# Patient Record
Sex: FEMALE | Race: WHITE | Employment: OTHER | URBAN - METROPOLITAN AREA
[De-identification: names, ages, dates, MRNs, and addresses within clinical notes are randomized per-mention and may not be internally consistent; named-entity substitution may affect disease eponyms.]

---

## 2017-01-12 ENCOUNTER — PATIENT OUTREACH (OUTPATIENT)
Dept: FAMILY MEDICINE CLINIC | Facility: CLINIC | Age: 62
End: 2017-01-12

## 2017-01-12 DIAGNOSIS — E11.9 CONTROLLED TYPE 2 DIABETES MELLITUS WITHOUT COMPLICATION, WITHOUT LONG-TERM CURRENT USE OF INSULIN (HCC): Primary | ICD-10-CM

## 2017-01-12 NOTE — PROGRESS NOTES
Called to pt's cell and informed her that I put the lab work in for her to be completed at her earliest convenience for her DM status. I informed her that she needs to be fasting for the 10 hours with only water prior to whenever she is able to go.  Left pt

## 2017-01-12 NOTE — PROGRESS NOTES
Pt. Returned the call and said she is currently in [place] until the beginning of March and will get her labs done upon return.   She also stated that she may need a refill 2 times ( as they are on a month to month refill issue) while she is down in [place]

## 2017-01-17 ENCOUNTER — TELEPHONE (OUTPATIENT)
Dept: FAMILY MEDICINE CLINIC | Facility: CLINIC | Age: 62
End: 2017-01-17

## 2017-01-17 RX ORDER — CELECOXIB 200 MG/1
CAPSULE ORAL
Qty: 30 CAPSULE | Refills: 1 | Status: SHIPPED | OUTPATIENT
Start: 2017-01-17 | End: 2017-03-19

## 2017-01-20 RX ORDER — POTASSIUM CHLORIDE 750 MG/1
10 TABLET, EXTENDED RELEASE ORAL
Qty: 90 TABLET | Refills: 0 | Status: SHIPPED | OUTPATIENT
Start: 2017-01-20 | End: 2017-05-15

## 2017-01-20 RX ORDER — FUROSEMIDE 20 MG/1
20 TABLET ORAL
Qty: 90 TABLET | Refills: 0 | Status: SHIPPED | OUTPATIENT
Start: 2017-01-20 | End: 2017-05-15

## 2017-01-20 RX ORDER — ATORVASTATIN CALCIUM 20 MG/1
TABLET, FILM COATED ORAL
Qty: 90 TABLET | Refills: 0 | Status: SHIPPED | OUTPATIENT
Start: 2017-01-20 | End: 2017-05-15

## 2017-01-20 RX ORDER — METFORMIN HYDROCHLORIDE 500 MG/1
TABLET, EXTENDED RELEASE ORAL
Qty: 90 TABLET | Refills: 0 | Status: SHIPPED | OUTPATIENT
Start: 2017-01-20 | End: 2017-04-04

## 2017-01-20 NOTE — TELEPHONE ENCOUNTER
Pt returning phone call from the nurse. Pt state that she is in [place] and does not returning until the middle of march. Pt will scheduled appt for after that time but until then she will need prescription to cover her until then.   Pt has scheduled appt

## 2017-03-14 ENCOUNTER — PATIENT OUTREACH (OUTPATIENT)
Dept: FAMILY MEDICINE CLINIC | Facility: CLINIC | Age: 62
End: 2017-03-14

## 2017-03-14 NOTE — PROGRESS NOTES
Called to pt. She had to cancel her appt for tomorrow as she has car issues, but she rescheduled for 4/4/17; and she is aware to try to obtain the labs before she goes to the appt so that she might have a more meaningful visit.  She is aware to be fasting w

## 2017-03-19 RX ORDER — CELECOXIB 200 MG/1
CAPSULE ORAL
Qty: 30 CAPSULE | Refills: 1 | Status: SHIPPED | OUTPATIENT
Start: 2017-03-19 | End: 2017-05-27

## 2017-04-01 ENCOUNTER — APPOINTMENT (OUTPATIENT)
Dept: LAB | Age: 62
End: 2017-04-01
Attending: FAMILY MEDICINE
Payer: COMMERCIAL

## 2017-04-01 DIAGNOSIS — E11.9 CONTROLLED TYPE 2 DIABETES MELLITUS WITHOUT COMPLICATION, WITHOUT LONG-TERM CURRENT USE OF INSULIN (HCC): ICD-10-CM

## 2017-04-01 PROCEDURE — 82570 ASSAY OF URINE CREATININE: CPT

## 2017-04-01 PROCEDURE — 80053 COMPREHEN METABOLIC PANEL: CPT

## 2017-04-01 PROCEDURE — 80061 LIPID PANEL: CPT

## 2017-04-01 PROCEDURE — 36415 COLL VENOUS BLD VENIPUNCTURE: CPT

## 2017-04-01 PROCEDURE — 82043 UR ALBUMIN QUANTITATIVE: CPT

## 2017-04-01 PROCEDURE — 83036 HEMOGLOBIN GLYCOSYLATED A1C: CPT

## 2017-04-01 PROCEDURE — 84443 ASSAY THYROID STIM HORMONE: CPT

## 2017-04-04 ENCOUNTER — OFFICE VISIT (OUTPATIENT)
Dept: FAMILY MEDICINE CLINIC | Facility: CLINIC | Age: 62
End: 2017-04-04

## 2017-04-04 ENCOUNTER — TELEPHONE (OUTPATIENT)
Dept: FAMILY MEDICINE CLINIC | Facility: CLINIC | Age: 62
End: 2017-04-04

## 2017-04-04 VITALS
TEMPERATURE: 98 F | SYSTOLIC BLOOD PRESSURE: 128 MMHG | WEIGHT: 293 LBS | HEART RATE: 83 BPM | BODY MASS INDEX: 70 KG/M2 | DIASTOLIC BLOOD PRESSURE: 91 MMHG

## 2017-04-04 DIAGNOSIS — E66.9 DIABETES MELLITUS TYPE 2 IN OBESE (HCC): Primary | ICD-10-CM

## 2017-04-04 DIAGNOSIS — E11.69 DIABETES MELLITUS TYPE 2 IN OBESE (HCC): Primary | ICD-10-CM

## 2017-04-04 DIAGNOSIS — I10 ESSENTIAL HYPERTENSION: ICD-10-CM

## 2017-04-04 PROCEDURE — 99214 OFFICE O/P EST MOD 30 MIN: CPT | Performed by: FAMILY MEDICINE

## 2017-04-04 PROCEDURE — 99212 OFFICE O/P EST SF 10 MIN: CPT | Performed by: FAMILY MEDICINE

## 2017-04-04 RX ORDER — METFORMIN HYDROCHLORIDE 500 MG/1
500 TABLET, EXTENDED RELEASE ORAL 2 TIMES DAILY WITH MEALS
Qty: 180 TABLET | Refills: 1 | Status: SHIPPED | OUTPATIENT
Start: 2017-04-04 | End: 2017-09-27

## 2017-04-04 RX ORDER — LISINOPRIL 10 MG/1
10 TABLET ORAL DAILY
Qty: 90 TABLET | Refills: 1 | Status: SHIPPED | OUTPATIENT
Start: 2017-04-04 | End: 2017-09-27

## 2017-04-04 RX ORDER — PROPRANOLOL/HYDROCHLOROTHIAZID 40 MG-25MG
TABLET ORAL
Qty: 180 CAPSULE | Refills: 1 | Status: ON HOLD | OUTPATIENT
Start: 2017-04-04 | End: 2018-07-20

## 2017-04-04 RX ORDER — ASPIRIN 81 MG/1
81 TABLET ORAL DAILY
Qty: 90 TABLET | Refills: 1 | Status: SHIPPED | OUTPATIENT
Start: 2017-04-04 | End: 2017-09-27

## 2017-04-04 NOTE — TELEPHONE ENCOUNTER
Pharmacy needs directions clarified for metformin hcl 500mg      Current Outpatient Prescriptions:  MetFORMIN HCl  MG Oral Tablet 24 Hr Take 1 tablet (500 mg total) by mouth 2 (two) times daily with meals.  TAKE 1 TABLET (500 MG TOTAL) BY MOUTH ONCE D

## 2017-04-11 NOTE — PROGRESS NOTES
HPI:    Patient ID: Jacy Ontiveros is a 60year old female. Diabetes  She presents for her follow-up diabetic visit. She has type 2 diabetes mellitus. Her disease course has been improving. There are no hypoglycemic associated symptoms.  Pertinent n CELECOXIB 200 MG Oral Cap TAKE ONE CAPSULE BY MOUTH EVERY DAY Disp: 30 capsule Rfl: 1   Potassium Chloride ER (KLOR-CON M10) 10 MEQ Oral Tab CR Take 1 tablet (10 mEq total) by mouth once daily.  Disp: 90 tablet Rfl: 0   furosemide 20 MG Oral Tab Take 1 ta

## 2017-04-20 ENCOUNTER — TELEPHONE (OUTPATIENT)
Dept: FAMILY MEDICINE CLINIC | Facility: CLINIC | Age: 62
End: 2017-04-20

## 2017-04-20 NOTE — TELEPHONE ENCOUNTER
Called to pt and she was made aware of Dr. Aroldo Gregorio answer to her question. She will get her lab done in 3 months and she knows the lab was already entered for her by Dr. Dinh Medellin.

## 2017-04-20 NOTE — TELEPHONE ENCOUNTER
Pt. Called to my  and said she had her labs and visit with you Dr. Raquel Elias, but a friend of hers also on Metformin asked her if she checks her blood sugars. She said she was never told to do and of course doesn't have any of the equipment.  What are your r

## 2017-05-09 ENCOUNTER — TELEPHONE (OUTPATIENT)
Dept: FAMILY MEDICINE CLINIC | Facility: CLINIC | Age: 62
End: 2017-05-09

## 2017-05-09 DIAGNOSIS — G47.30 SLEEP APNEA, UNSPECIFIED TYPE: Primary | ICD-10-CM

## 2017-05-16 RX ORDER — FUROSEMIDE 20 MG/1
TABLET ORAL
Qty: 90 TABLET | Refills: 0 | Status: SHIPPED | OUTPATIENT
Start: 2017-05-16 | End: 2017-08-11

## 2017-05-16 RX ORDER — ATORVASTATIN CALCIUM 20 MG/1
TABLET, FILM COATED ORAL
Qty: 90 TABLET | Refills: 0 | Status: SHIPPED | OUTPATIENT
Start: 2017-05-16 | End: 2017-08-11

## 2017-05-16 NOTE — TELEPHONE ENCOUNTER
PLEASE ADVISE ON REFILL. THANKS.   Recent Visits       Provider Department Primary Dx    1 month ago Crystal Parnell MD [facility], [facility], [address] Diabetes mellitus type 2 in obese [facility])    11 months ago Crystal Parnell MD [address] [facility] 04/01/2017   GLOBULIN 3.6 04/01/2017   AGRATIO 1.1 02/23/2016   ANIONGAP 7 04/01/2017   OSMOCALC 295 04/01/2017

## 2017-05-18 RX ORDER — POTASSIUM CHLORIDE 10 MEQ/1
TABLET, EXTENDED RELEASE ORAL
Qty: 90 TABLET | Refills: 0 | Status: SHIPPED | OUTPATIENT
Start: 2017-05-18 | End: 2017-08-15

## 2017-05-20 RX ORDER — POTASSIUM CHLORIDE 750 MG/1
10 TABLET, EXTENDED RELEASE ORAL
Qty: 90 TABLET | Refills: 0 | Status: CANCELLED | OUTPATIENT
Start: 2017-05-20

## 2017-05-20 NOTE — TELEPHONE ENCOUNTER
From: Amrita Cheng  To:  Reynaldo Deras MD  Sent: 5/17/2017 5:24 PM CDT  Subject: Medication Renewal Request    Original authorizing provider: MD Amrita Beaver would like a refill of the following medications:  Potassium Chlori

## 2017-05-30 RX ORDER — CELECOXIB 200 MG/1
CAPSULE ORAL
Qty: 30 CAPSULE | Refills: 1 | Status: SHIPPED | OUTPATIENT
Start: 2017-05-30 | End: 2017-07-29

## 2017-05-30 NOTE — TELEPHONE ENCOUNTER
Shaw Ayala from [facility] calling to inquire on status. States that they need referral for patient's HMO insurance. Requesting CPAP supplies - NOT machine. Please fax referral  once approved to [phone].  States that they have not received anyth

## 2017-06-02 NOTE — TELEPHONE ENCOUNTER
Per Roma Deluna they received an Order BUT need referral due to pt has an HMO pls fax the referral for Cpap supplies of pt to [phone] Attn: Roma Deluna.

## 2017-07-31 ENCOUNTER — TELEPHONE (OUTPATIENT)
Dept: INTERNAL MEDICINE CLINIC | Facility: CLINIC | Age: 62
End: 2017-07-31

## 2017-07-31 RX ORDER — CELECOXIB 200 MG/1
CAPSULE ORAL
Qty: 30 CAPSULE | Refills: 1 | Status: SHIPPED | OUTPATIENT
Start: 2017-07-31 | End: 2017-10-03

## 2017-08-14 ENCOUNTER — TELEPHONE (OUTPATIENT)
Dept: INTERNAL MEDICINE CLINIC | Facility: CLINIC | Age: 62
End: 2017-08-14

## 2017-08-14 DIAGNOSIS — Z12.11 ENCOUNTER FOR COLORECTAL CANCER SCREENING: Primary | ICD-10-CM

## 2017-08-14 DIAGNOSIS — Z12.12 ENCOUNTER FOR COLORECTAL CANCER SCREENING: Primary | ICD-10-CM

## 2017-08-14 RX ORDER — FUROSEMIDE 20 MG/1
TABLET ORAL
Qty: 90 TABLET | Refills: 0 | Status: SHIPPED | OUTPATIENT
Start: 2017-08-14 | End: 2017-12-20

## 2017-08-14 RX ORDER — ATORVASTATIN CALCIUM 20 MG/1
TABLET, FILM COATED ORAL
Qty: 90 TABLET | Refills: 0 | Status: SHIPPED | OUTPATIENT
Start: 2017-08-14 | End: 2017-11-23

## 2017-08-14 NOTE — TELEPHONE ENCOUNTER
After a thorough review of the medical record and after contacting the patient, the patient has agreed to a FIT. I have pended the order. A-T Advancement Flap Text: The defect edges were debeveled with a #15 scalpel blade.  Given the location of the defect, shape of the defect and the proximity to free margins an A-T advancement flap was deemed most appropriate.  Using a sterile surgical marker, an appropriate advancement flap was drawn incorporating the defect and placing the expected incisions within the relaxed skin tension lines where possible.    The area thus outlined was incised deep to adipose tissue with a #15 scalpel blade.  The skin margins were undermined to an appropriate distance in all directions utilizing iris scissors.

## 2017-08-15 RX ORDER — POTASSIUM CHLORIDE 10 MEQ/1
TABLET, EXTENDED RELEASE ORAL
Qty: 90 TABLET | Refills: 0 | Status: SHIPPED | OUTPATIENT
Start: 2017-08-15 | End: 2017-11-17

## 2017-09-08 ENCOUNTER — TELEPHONE (OUTPATIENT)
Dept: OPHTHALMOLOGY | Facility: CLINIC | Age: 62
End: 2017-09-08

## 2017-09-08 DIAGNOSIS — E66.9 DIABETES MELLITUS TYPE 2 IN OBESE (HCC): Primary | ICD-10-CM

## 2017-09-08 DIAGNOSIS — E11.69 DIABETES MELLITUS TYPE 2 IN OBESE (HCC): Primary | ICD-10-CM

## 2017-09-08 NOTE — TELEPHONE ENCOUNTER
Patient has an appointment scheduled with Dr. Sean Lee on 9/14/17 and they need a referral for the appointment. Please do not hesitate to call me if you have any questions about this. Thank you, Sandra Morales at Dr. Paige Devine office [phone].    Diagnosis for a sushma

## 2017-09-14 ENCOUNTER — NURSE ONLY (OUTPATIENT)
Dept: OPHTHALMOLOGY | Facility: CLINIC | Age: 62
End: 2017-09-14

## 2017-09-14 ENCOUNTER — OFFICE VISIT (OUTPATIENT)
Dept: OPHTHALMOLOGY | Facility: CLINIC | Age: 62
End: 2017-09-14

## 2017-09-14 DIAGNOSIS — E11.9 DIABETES MELLITUS TYPE 2 WITHOUT RETINOPATHY (HCC): Primary | ICD-10-CM

## 2017-09-14 DIAGNOSIS — E11.69 DIABETES MELLITUS TYPE 2 IN OBESE (HCC): Primary | ICD-10-CM

## 2017-09-14 DIAGNOSIS — H43.393 VITREOUS FLOATERS OF BOTH EYES: ICD-10-CM

## 2017-09-14 DIAGNOSIS — H35.371 MACULAR PUCKER, RIGHT: ICD-10-CM

## 2017-09-14 DIAGNOSIS — H25.13 AGE-RELATED NUCLEAR CATARACT OF BOTH EYES: ICD-10-CM

## 2017-09-14 DIAGNOSIS — E66.9 DIABETES MELLITUS TYPE 2 IN OBESE (HCC): Primary | ICD-10-CM

## 2017-09-14 PROCEDURE — 99243 OFF/OP CNSLTJ NEW/EST LOW 30: CPT | Performed by: OPHTHALMOLOGY

## 2017-09-14 PROCEDURE — 99212 OFFICE O/P EST SF 10 MIN: CPT | Performed by: OPHTHALMOLOGY

## 2017-09-14 NOTE — PROGRESS NOTES
Joleen Austin is a 60year old female.     HPI:     HPI     Diabetic Eye Exam    Additional comments: Pt has been a diabetic for 3 years  3 years on pills/  0 years on Insulin   Pt does not check her BS at home   Last HA1C was  6.1 on 4/1/17 capsule Rfl: 1   MetFORMIN HCl  MG Oral Tablet 24 Hr Take 1 tablet (500 mg total) by mouth 2 (two) times daily with meals. TAKE 1 TABLET (500 MG TOTAL) BY MOUTH ONCE DAILY.  Disp: 180 tablet Rfl: 1   lisinopril 10 MG Oral Tab Take 1 tablet (10 mg tota Vessels Normal Normal    Periphery Normal Normal            Refraction     Wearing Rx       Sphere Cylinder Axis Add    Right -2.50 +1.00 010 +2.50    Left -3.00 +0.50 180 +2.50    Type:  Progressive bifocal          Manifest Refraction     Not tested.   Pa

## 2017-09-14 NOTE — PROGRESS NOTES
Diabetic Annual Assessment Clinical Note    There were no vitals taken for this visit. Foot Assessment    Visual Inspection of the feet completed at appointment.     · Skin Normal  · Callous Abnormal, some callouses noted bilateral heels  · Redness Norm

## 2017-09-14 NOTE — PROGRESS NOTES
Maximino Blevins is a 60year old female.     HPI:       Patient History:  Past Medical History:   Diagnosis Date   • Diabetes ([facility] [address]    • Diabetes mellitus ([facility] [address]    • HTN (hypertension), benign 1/20/2016   • Morbid obesity with BMI of 60.0-69.9, adult (H capsule Rfl: 1       Allergies:    Penicillins                 Comment:Other reaction(s): Rash    ROS:         PHYSICAL EXAM:      Not recorded           ASSESSMENT/PLAN:     Diagnoses and Plan:     No problem-specific Assessment & Plan notes found for [facility]

## 2017-09-14 NOTE — PATIENT INSTRUCTIONS
Diabetes mellitus type 2 without retinopathy ([facility] [address]  Diabetes type II: no background of retinopathy, no signs of neovascularization noted. Discussed ocular and systemic benefits of blood sugar control.   Diagnosis and treatment discussed in detail with allyssa

## 2017-09-27 RX ORDER — ASPIRIN 81 MG/1
81 TABLET ORAL DAILY
Qty: 90 TABLET | Refills: 1 | Status: SHIPPED | OUTPATIENT
Start: 2017-09-27 | End: 2018-06-15

## 2017-09-27 RX ORDER — LISINOPRIL 10 MG/1
10 TABLET ORAL DAILY
Qty: 90 TABLET | Refills: 1 | Status: SHIPPED | OUTPATIENT
Start: 2017-09-27 | End: 2018-03-19

## 2017-09-27 RX ORDER — METFORMIN HYDROCHLORIDE 500 MG/1
TABLET, EXTENDED RELEASE ORAL
Qty: 180 TABLET | Refills: 1 | Status: SHIPPED | OUTPATIENT
Start: 2017-09-27 | End: 2018-03-19

## 2017-10-03 RX ORDER — CELECOXIB 200 MG/1
CAPSULE ORAL
Qty: 30 CAPSULE | Refills: 1 | Status: SHIPPED | OUTPATIENT
Start: 2017-10-03 | End: 2017-12-21

## 2017-10-04 ENCOUNTER — TELEPHONE (OUTPATIENT)
Dept: INTERNAL MEDICINE CLINIC | Facility: CLINIC | Age: 62
End: 2017-10-04

## 2017-10-24 ENCOUNTER — OFFICE VISIT (OUTPATIENT)
Dept: FAMILY MEDICINE CLINIC | Facility: CLINIC | Age: 62
End: 2017-10-24

## 2017-10-24 VITALS — HEART RATE: 87 BPM | DIASTOLIC BLOOD PRESSURE: 80 MMHG | SYSTOLIC BLOOD PRESSURE: 120 MMHG | TEMPERATURE: 98 F

## 2017-10-24 DIAGNOSIS — F51.02 ADJUSTMENT INSOMNIA: ICD-10-CM

## 2017-10-24 DIAGNOSIS — R42 VERTIGO: Primary | ICD-10-CM

## 2017-10-24 PROCEDURE — 99214 OFFICE O/P EST MOD 30 MIN: CPT | Performed by: FAMILY MEDICINE

## 2017-10-24 PROCEDURE — 99212 OFFICE O/P EST SF 10 MIN: CPT | Performed by: FAMILY MEDICINE

## 2017-10-24 RX ORDER — MECLIZINE HCL 12.5 MG/1
12.5 TABLET ORAL 3 TIMES DAILY PRN
Qty: 40 TABLET | Refills: 0 | Status: SHIPPED | OUTPATIENT
Start: 2017-10-24 | End: 2019-08-16

## 2017-10-24 RX ORDER — ZOLPIDEM TARTRATE 10 MG/1
10 TABLET ORAL NIGHTLY PRN
Qty: 30 TABLET | Refills: 0 | Status: SHIPPED | OUTPATIENT
Start: 2017-10-24 | End: 2017-12-16

## 2017-10-25 NOTE — PROGRESS NOTES
HPI:    Patient ID: Jareth Angulo is a 60year old female. Had vertigo in the past so this is not new. FRiday night vertigo was bad lasted couple of hours. Now its better, it was bad when she would put head on the right side.    Had nausea but no Cardiovascular: Normal rate, regular rhythm, normal heart sounds and intact distal pulses. Pulmonary/Chest: Effort normal and breath sounds normal.   Neurological: She is alert and oriented to person, place, and time. She has normal reflexes.  She disp

## 2017-11-17 ENCOUNTER — TELEPHONE (OUTPATIENT)
Dept: FAMILY MEDICINE CLINIC | Facility: CLINIC | Age: 62
End: 2017-11-17

## 2017-11-17 NOTE — TELEPHONE ENCOUNTER
Pt requesting RN/Jorge appt for flu vaccine  Available any day except 11/20  Available morning or afternoon    Is flu vaccine still available?

## 2017-11-21 RX ORDER — POTASSIUM CHLORIDE 10 MEQ/1
TABLET, EXTENDED RELEASE ORAL
Qty: 90 TABLET | Refills: 0 | Status: SHIPPED | OUTPATIENT
Start: 2017-11-21 | End: 2018-02-12

## 2017-11-23 RX ORDER — ATORVASTATIN CALCIUM 20 MG/1
TABLET, FILM COATED ORAL
Qty: 90 TABLET | Refills: 0 | Status: SHIPPED | OUTPATIENT
Start: 2017-11-23 | End: 2018-02-12

## 2017-12-01 ENCOUNTER — IMMUNIZATION (OUTPATIENT)
Dept: FAMILY MEDICINE CLINIC | Facility: CLINIC | Age: 62
End: 2017-12-01

## 2017-12-01 DIAGNOSIS — Z23 NEED FOR VACCINATION: ICD-10-CM

## 2017-12-01 PROCEDURE — 90471 IMMUNIZATION ADMIN: CPT | Performed by: FAMILY MEDICINE

## 2017-12-01 PROCEDURE — 90686 IIV4 VACC NO PRSV 0.5 ML IM: CPT | Performed by: FAMILY MEDICINE

## 2017-12-07 NOTE — TELEPHONE ENCOUNTER
Pt states that she needs an order for replace the parts on her CPAP. Pt states that this is for the hose, filters, mask, water chamber or anything that the CPAP uses. Please send the order to [employer] Phone: [phone]. Please, call pt with any questions.

## 2017-12-18 NOTE — TELEPHONE ENCOUNTER
LOV: 10/24/17  Last Rx: 10/24/17    Please advise in regards to refill request. Thank You    Please respond to pool: HUMAIRA [facility] & long term LPN/ELLIS

## 2017-12-20 NOTE — TELEPHONE ENCOUNTER
Hypertensive Medications Please advise on Furosemide Rx refill LOV 10/24/17 LR 8/14/17     Rx pended for MD approval.    Protocol Criteria:  · Appointment scheduled in the past 6 months or in the next 3 months  · BMP or CMP in the past 12 months  · Creatin

## 2017-12-21 RX ORDER — FUROSEMIDE 20 MG/1
TABLET ORAL
Qty: 90 TABLET | Refills: 0 | Status: SHIPPED | OUTPATIENT
Start: 2017-12-21 | End: 2018-03-20

## 2017-12-21 RX ORDER — ZOLPIDEM TARTRATE 10 MG/1
TABLET ORAL
Qty: 30 TABLET | Refills: 0 | OUTPATIENT
Start: 2017-12-21 | End: 2018-03-09

## 2017-12-23 RX ORDER — ZOLPIDEM TARTRATE 10 MG/1
TABLET ORAL
Qty: 30 TABLET | Refills: 0 | OUTPATIENT
Start: 2017-12-23

## 2017-12-23 NOTE — TELEPHONE ENCOUNTER
Pharmacy   [facility]/PHARMACY #[id number] - [place] - [address]  [facility] [address], [phone], [phone]   Medication Detail    Disp Refills Start End    ZOLPIDEM TARTRATE 10 MG Oral Tab 30 tablet 0 12/21/2017     Sig: TAKE 1 TABLET B

## 2018-02-13 RX ORDER — ATORVASTATIN CALCIUM 20 MG/1
TABLET, FILM COATED ORAL
Qty: 90 TABLET | Refills: 0 | Status: SHIPPED | OUTPATIENT
Start: 2018-02-13 | End: 2018-04-24

## 2018-02-13 RX ORDER — POTASSIUM CHLORIDE 10 MEQ/1
TABLET, EXTENDED RELEASE ORAL
Qty: 90 TABLET | Refills: 0 | Status: SHIPPED | OUTPATIENT
Start: 2018-02-13 | End: 2018-05-01

## 2018-03-09 RX ORDER — ZOLPIDEM TARTRATE 10 MG/1
TABLET ORAL
Qty: 30 TABLET | Refills: 0 | OUTPATIENT
Start: 2018-03-09 | End: 2018-04-24

## 2018-03-09 RX ORDER — ZOLPIDEM TARTRATE 10 MG/1
10 TABLET ORAL
Qty: 30 TABLET | Refills: 0 | Status: CANCELLED
Start: 2018-03-09

## 2018-03-09 NOTE — TELEPHONE ENCOUNTER
From: Amrita Cheng  Sent: 3/9/2018 8:36 AM CST  Subject: Medication Renewal Request    Amrita Cheng would like a refill of the following medications:     ZOLPIDEM TARTRATE 10 MG Oral Tab Heather Linton MD]    Preferred pharmacy: [facility]/PHARMACY

## 2018-03-13 NOTE — TELEPHONE ENCOUNTER
Prescription was phoned to the [facility] in [facility]. [address] [facility] per pt request. She is returning home tomorrow but will  prescription today. Script was phoned in as written by Dr. LOVELL.

## 2018-03-19 RX ORDER — LISINOPRIL 10 MG/1
10 TABLET ORAL DAILY
Qty: 90 TABLET | Refills: 0 | Status: SHIPPED | OUTPATIENT
Start: 2018-03-19 | End: 2018-06-16

## 2018-03-19 RX ORDER — METFORMIN HYDROCHLORIDE 500 MG/1
TABLET, EXTENDED RELEASE ORAL
Qty: 180 TABLET | Refills: 0 | Status: SHIPPED | OUTPATIENT
Start: 2018-03-19 | End: 2018-06-25

## 2018-03-20 RX ORDER — FUROSEMIDE 20 MG/1
TABLET ORAL
Qty: 90 TABLET | Refills: 0 | Status: SHIPPED | OUTPATIENT
Start: 2018-03-20 | End: 2018-06-16

## 2018-04-18 NOTE — TELEPHONE ENCOUNTER
Attempted to reach patient, no answer voicemail left to call back office and schedule appointment for further refills.

## 2018-04-18 NOTE — TELEPHONE ENCOUNTER
Karey Bucio MD   Em  Candace Lpn/Cma 1 month ago      Needs an apt after tis refill (Routing comment)

## 2018-04-24 ENCOUNTER — OFFICE VISIT (OUTPATIENT)
Dept: FAMILY MEDICINE CLINIC | Facility: CLINIC | Age: 63
End: 2018-04-24

## 2018-04-24 VITALS
TEMPERATURE: 98 F | DIASTOLIC BLOOD PRESSURE: 71 MMHG | HEART RATE: 70 BPM | HEIGHT: 60 IN | SYSTOLIC BLOOD PRESSURE: 111 MMHG | BODY MASS INDEX: 57.52 KG/M2 | WEIGHT: 293 LBS

## 2018-04-24 DIAGNOSIS — E11.69 DIABETES MELLITUS TYPE 2 IN OBESE (HCC): ICD-10-CM

## 2018-04-24 DIAGNOSIS — M25.562 PAIN IN BOTH KNEES, UNSPECIFIED CHRONICITY: ICD-10-CM

## 2018-04-24 DIAGNOSIS — M25.512 BILATERAL SHOULDER PAIN, UNSPECIFIED CHRONICITY: Primary | ICD-10-CM

## 2018-04-24 DIAGNOSIS — M25.511 BILATERAL SHOULDER PAIN, UNSPECIFIED CHRONICITY: Primary | ICD-10-CM

## 2018-04-24 DIAGNOSIS — E66.9 DIABETES MELLITUS TYPE 2 IN OBESE (HCC): ICD-10-CM

## 2018-04-24 DIAGNOSIS — M25.561 PAIN IN BOTH KNEES, UNSPECIFIED CHRONICITY: ICD-10-CM

## 2018-04-24 PROCEDURE — 99212 OFFICE O/P EST SF 10 MIN: CPT | Performed by: FAMILY MEDICINE

## 2018-04-24 PROCEDURE — 99214 OFFICE O/P EST MOD 30 MIN: CPT | Performed by: FAMILY MEDICINE

## 2018-04-24 RX ORDER — ATORVASTATIN CALCIUM 20 MG/1
TABLET, FILM COATED ORAL
Qty: 90 TABLET | Refills: 0 | Status: SHIPPED | OUTPATIENT
Start: 2018-04-24 | End: 2018-07-13

## 2018-04-24 RX ORDER — ZOLPIDEM TARTRATE 10 MG/1
TABLET ORAL
Qty: 30 TABLET | Refills: 1 | Status: SHIPPED | OUTPATIENT
Start: 2018-04-24 | End: 2018-07-09

## 2018-04-24 RX ORDER — LISINOPRIL 10 MG/1
10 TABLET ORAL DAILY
Qty: 90 TABLET | Refills: 0 | Status: CANCELLED | OUTPATIENT
Start: 2018-04-24 | End: 2019-04-19

## 2018-04-24 RX ORDER — MECLIZINE HCL 12.5 MG/1
12.5 TABLET ORAL 3 TIMES DAILY PRN
Qty: 40 TABLET | Refills: 0 | Status: CANCELLED | OUTPATIENT
Start: 2018-04-24

## 2018-04-24 RX ORDER — METFORMIN HYDROCHLORIDE 500 MG/1
TABLET, EXTENDED RELEASE ORAL
Qty: 180 TABLET | Refills: 0 | Status: CANCELLED | OUTPATIENT
Start: 2018-04-24

## 2018-04-24 RX ORDER — FUROSEMIDE 20 MG/1
TABLET ORAL
Qty: 90 TABLET | Refills: 0 | Status: CANCELLED | OUTPATIENT
Start: 2018-04-24

## 2018-04-24 RX ORDER — POTASSIUM CHLORIDE 750 MG/1
TABLET, EXTENDED RELEASE ORAL
Qty: 90 TABLET | Refills: 0 | Status: CANCELLED | OUTPATIENT
Start: 2018-04-24

## 2018-04-24 RX ORDER — CELECOXIB 200 MG/1
200 CAPSULE ORAL
Qty: 30 CAPSULE | Refills: 1 | Status: CANCELLED | OUTPATIENT
Start: 2018-04-24

## 2018-04-24 NOTE — PROGRESS NOTES
HPI:    Patient ID: Mayra Luis is a 58year old female. Pain is both shoulders and knees. having less and less ROM. Using crutch for walking. Also needs zolpidem for sleep as needed.    As far as diabetes nedds blood test  htn controlled EXAM:   Physical Exam   Constitutional: She appears well-developed and well-nourished. Cardiovascular: Normal rate, regular rhythm, normal heart sounds and intact distal pulses.     Pulmonary/Chest: Effort normal and breath sounds normal.   Musculoskeleta

## 2018-04-26 ENCOUNTER — TELEPHONE (OUTPATIENT)
Dept: FAMILY MEDICINE CLINIC | Facility: CLINIC | Age: 63
End: 2018-04-26

## 2018-04-26 NOTE — TELEPHONE ENCOUNTER
PATIENT DID NOT RECEIVED THIS MEDICATION AT HER APT ( NO SCRIPT GIVEN )  PLEASE CALL IN OR FAX TO [facility] -  INFORMED PATIENT WHEN COMPLETED.

## 2018-04-26 NOTE — TELEPHONE ENCOUNTER
Pt requesting to have Zolpidem prescription called in to pharmacy, informed her prescription will be called in to pharmacy now.  Reviewed Zolpidem prescription information with [facility] pharmacist.

## 2018-05-02 RX ORDER — POTASSIUM CHLORIDE 750 MG/1
10 TABLET, EXTENDED RELEASE ORAL DAILY
Qty: 90 TABLET | Refills: 0 | Status: ON HOLD | OUTPATIENT
Start: 2018-05-02 | End: 2018-07-22

## 2018-05-02 NOTE — TELEPHONE ENCOUNTER
LOV: 4-24-18 Last Rx: 2-13-18     No protocol     Please advise in regards to refill request. Thank You

## 2018-05-02 NOTE — TELEPHONE ENCOUNTER
From: Kennedi Powell  Sent: 5/1/2018 10:33 PM CDT  Subject: Medication Renewal Request    Kennedi Sherry would like a refill of the following medications:     KLOR-CON M10 10 MEQ Oral Tab CR Roslyn Soliman MD]    [facility]

## 2018-05-03 RX ORDER — CELECOXIB 200 MG/1
CAPSULE ORAL
Qty: 30 CAPSULE | Refills: 1 | Status: SHIPPED | OUTPATIENT
Start: 2018-05-03 | End: 2018-07-01

## 2018-05-15 ENCOUNTER — OFFICE VISIT (OUTPATIENT)
Dept: PHYSICAL THERAPY | Age: 63
End: 2018-05-15
Attending: FAMILY MEDICINE
Payer: COMMERCIAL

## 2018-05-15 DIAGNOSIS — M25.561 PAIN IN BOTH KNEES, UNSPECIFIED CHRONICITY: ICD-10-CM

## 2018-05-15 DIAGNOSIS — M25.512 BILATERAL SHOULDER PAIN, UNSPECIFIED CHRONICITY: ICD-10-CM

## 2018-05-15 DIAGNOSIS — M25.511 BILATERAL SHOULDER PAIN, UNSPECIFIED CHRONICITY: ICD-10-CM

## 2018-05-15 DIAGNOSIS — M25.562 PAIN IN BOTH KNEES, UNSPECIFIED CHRONICITY: ICD-10-CM

## 2018-05-15 PROCEDURE — 97162 PT EVAL MOD COMPLEX 30 MIN: CPT | Performed by: PHYSICAL THERAPIST

## 2018-05-15 PROCEDURE — 97530 THERAPEUTIC ACTIVITIES: CPT | Performed by: PHYSICAL THERAPIST

## 2018-05-15 NOTE — PROGRESS NOTES
P.T. EVALUATION:   Referring Physician: Dr. Reymundo Miller  Diagnosis: Bilateral shoulder pain, unspecified chronicity (M25.511,M25.512)  Pain in both knees, unspecified chronicity (M25.561,M25.562)    Date of Onset: 4/24/2018 Date of Service: 5/15/2018     MAICO sitting. Balance: WFL    Gait: Walks independently with straight cane. Can walk indoors without device. Demonstrates some waddling and shuffling steps.     Today’s Treatment and Response:  Patient education provided on PT eval findings, treatment plan, g as possible to [phone].  If you have any questions, please contact me at Dept: [phone]    Sincerely,  Electronically signed by therapist: Cristian Campo PT    Physician's certification required: Yes  I certify the need for these services furnishe

## 2018-05-21 ENCOUNTER — OFFICE VISIT (OUTPATIENT)
Dept: PHYSICAL THERAPY | Age: 63
End: 2018-05-21
Attending: FAMILY MEDICINE
Payer: COMMERCIAL

## 2018-05-21 DIAGNOSIS — M25.512 BILATERAL SHOULDER PAIN, UNSPECIFIED CHRONICITY: ICD-10-CM

## 2018-05-21 DIAGNOSIS — M25.511 BILATERAL SHOULDER PAIN, UNSPECIFIED CHRONICITY: ICD-10-CM

## 2018-05-21 DIAGNOSIS — M25.561 PAIN IN BOTH KNEES, UNSPECIFIED CHRONICITY: ICD-10-CM

## 2018-05-21 DIAGNOSIS — M25.562 PAIN IN BOTH KNEES, UNSPECIFIED CHRONICITY: ICD-10-CM

## 2018-05-21 PROCEDURE — 97110 THERAPEUTIC EXERCISES: CPT | Performed by: PHYSICAL THERAPIST

## 2018-05-21 NOTE — PROGRESS NOTES
Diagnosis: Bilateral shoulder pain, unspecified chronicity (M25.511,M25.512)  Pain in both knees, unspecified chronicity (M25.561,M25.562)          Next MD visit: none scheduled  Fall Risk: standard         Precautions: n/a          Medication Changes [facility]

## 2018-05-23 ENCOUNTER — OFFICE VISIT (OUTPATIENT)
Dept: PHYSICAL THERAPY | Age: 63
End: 2018-05-23
Attending: FAMILY MEDICINE
Payer: COMMERCIAL

## 2018-05-23 DIAGNOSIS — M25.512 BILATERAL SHOULDER PAIN, UNSPECIFIED CHRONICITY: ICD-10-CM

## 2018-05-23 DIAGNOSIS — M25.511 BILATERAL SHOULDER PAIN, UNSPECIFIED CHRONICITY: ICD-10-CM

## 2018-05-23 DIAGNOSIS — M25.561 PAIN IN BOTH KNEES, UNSPECIFIED CHRONICITY: ICD-10-CM

## 2018-05-23 DIAGNOSIS — M25.562 PAIN IN BOTH KNEES, UNSPECIFIED CHRONICITY: ICD-10-CM

## 2018-05-23 PROCEDURE — 97110 THERAPEUTIC EXERCISES: CPT

## 2018-05-23 NOTE — PROGRESS NOTES
Diagnosis: Bilateral shoulder pain, unspecified chronicity (M25.511,M25.512)  Pain in both knees, unspecified chronicity (M25.561,M25.562)          Next MD visit: none scheduled  Fall Risk: standard         Precautions: n/a          Medication Changes [facility]

## 2018-05-30 ENCOUNTER — OFFICE VISIT (OUTPATIENT)
Dept: PHYSICAL THERAPY | Age: 63
End: 2018-05-30
Attending: FAMILY MEDICINE
Payer: COMMERCIAL

## 2018-05-30 DIAGNOSIS — M25.511 BILATERAL SHOULDER PAIN, UNSPECIFIED CHRONICITY: ICD-10-CM

## 2018-05-30 DIAGNOSIS — M25.561 PAIN IN BOTH KNEES, UNSPECIFIED CHRONICITY: ICD-10-CM

## 2018-05-30 DIAGNOSIS — M25.512 BILATERAL SHOULDER PAIN, UNSPECIFIED CHRONICITY: ICD-10-CM

## 2018-05-30 DIAGNOSIS — M25.562 PAIN IN BOTH KNEES, UNSPECIFIED CHRONICITY: ICD-10-CM

## 2018-05-30 PROCEDURE — 97110 THERAPEUTIC EXERCISES: CPT | Performed by: PHYSICAL THERAPIST

## 2018-05-30 NOTE — PROGRESS NOTES
Diagnosis: Bilateral shoulder pain, unspecified chronicity (M25.511,M25.512)  Pain in both knees, unspecified chronicity (M25.561,M25.562)          Next MD visit: none scheduled  Fall Risk: standard         Precautions: n/a          Medication Changes [facility]

## 2018-06-06 ENCOUNTER — OFFICE VISIT (OUTPATIENT)
Dept: PHYSICAL THERAPY | Age: 63
End: 2018-06-06
Attending: FAMILY MEDICINE
Payer: COMMERCIAL

## 2018-06-06 DIAGNOSIS — M25.511 BILATERAL SHOULDER PAIN, UNSPECIFIED CHRONICITY: ICD-10-CM

## 2018-06-06 DIAGNOSIS — M25.561 PAIN IN BOTH KNEES, UNSPECIFIED CHRONICITY: ICD-10-CM

## 2018-06-06 DIAGNOSIS — M25.562 PAIN IN BOTH KNEES, UNSPECIFIED CHRONICITY: ICD-10-CM

## 2018-06-06 DIAGNOSIS — M25.512 BILATERAL SHOULDER PAIN, UNSPECIFIED CHRONICITY: ICD-10-CM

## 2018-06-06 PROCEDURE — 97110 THERAPEUTIC EXERCISES: CPT

## 2018-06-06 NOTE — PROGRESS NOTES
Diagnosis: Bilateral shoulder pain, unspecified chronicity (M25.511,M25.512)  Pain in both knees, unspecified chronicity (M25.561,M25.562)          Next MD visit: none scheduled  Fall Risk: standard         Precautions: n/a          Medication Changes [facility]

## 2018-06-08 ENCOUNTER — APPOINTMENT (OUTPATIENT)
Dept: PHYSICAL THERAPY | Age: 63
End: 2018-06-08
Attending: FAMILY MEDICINE
Payer: COMMERCIAL

## 2018-06-08 ENCOUNTER — TELEPHONE (OUTPATIENT)
Dept: PHYSICAL THERAPY | Age: 63
End: 2018-06-08

## 2018-06-11 ENCOUNTER — OFFICE VISIT (OUTPATIENT)
Dept: PHYSICAL THERAPY | Age: 63
End: 2018-06-11
Attending: FAMILY MEDICINE
Payer: COMMERCIAL

## 2018-06-11 ENCOUNTER — LAB ENCOUNTER (OUTPATIENT)
Dept: LAB | Age: 63
End: 2018-06-11
Attending: FAMILY MEDICINE
Payer: COMMERCIAL

## 2018-06-11 DIAGNOSIS — E66.9 DIABETES MELLITUS TYPE 2 IN OBESE (HCC): ICD-10-CM

## 2018-06-11 DIAGNOSIS — E11.69 DIABETES MELLITUS TYPE 2 IN OBESE (HCC): ICD-10-CM

## 2018-06-11 DIAGNOSIS — M25.561 PAIN IN BOTH KNEES, UNSPECIFIED CHRONICITY: ICD-10-CM

## 2018-06-11 DIAGNOSIS — M25.511 BILATERAL SHOULDER PAIN, UNSPECIFIED CHRONICITY: ICD-10-CM

## 2018-06-11 DIAGNOSIS — M25.512 BILATERAL SHOULDER PAIN, UNSPECIFIED CHRONICITY: ICD-10-CM

## 2018-06-11 DIAGNOSIS — M25.562 PAIN IN BOTH KNEES, UNSPECIFIED CHRONICITY: ICD-10-CM

## 2018-06-11 PROCEDURE — 82570 ASSAY OF URINE CREATININE: CPT

## 2018-06-11 PROCEDURE — 83036 HEMOGLOBIN GLYCOSYLATED A1C: CPT

## 2018-06-11 PROCEDURE — 84443 ASSAY THYROID STIM HORMONE: CPT

## 2018-06-11 PROCEDURE — 36415 COLL VENOUS BLD VENIPUNCTURE: CPT

## 2018-06-11 PROCEDURE — 97110 THERAPEUTIC EXERCISES: CPT | Performed by: PHYSICAL THERAPIST

## 2018-06-11 PROCEDURE — 85025 COMPLETE CBC W/AUTO DIFF WBC: CPT

## 2018-06-11 PROCEDURE — 82043 UR ALBUMIN QUANTITATIVE: CPT

## 2018-06-11 PROCEDURE — 80061 LIPID PANEL: CPT

## 2018-06-11 PROCEDURE — 80053 COMPREHEN METABOLIC PANEL: CPT

## 2018-06-11 NOTE — PROGRESS NOTES
Diagnosis: Bilateral shoulder pain, unspecified chronicity (M25.511,M25.512)  Pain in both knees, unspecified chronicity (M25.561,M25.562)          Next MD visit: none scheduled  Fall Risk: standard         Precautions: n/a          Medication Changes [facility]

## 2018-06-13 ENCOUNTER — OFFICE VISIT (OUTPATIENT)
Dept: PHYSICAL THERAPY | Age: 63
End: 2018-06-13
Attending: FAMILY MEDICINE
Payer: COMMERCIAL

## 2018-06-13 PROCEDURE — 97530 THERAPEUTIC ACTIVITIES: CPT | Performed by: PHYSICAL THERAPIST

## 2018-06-13 PROCEDURE — 97110 THERAPEUTIC EXERCISES: CPT | Performed by: PHYSICAL THERAPIST

## 2018-06-13 NOTE — PROGRESS NOTES
Physical Therapy Treatment and Progress Report    Patient Name: Nonie Bence, MRN: [id number]  Date: 6/13/2018  Referring Physician: Dr. Almetta Hammans    Diagnosis: Bilateral shoulder pain, unspecified chronicity (M25.511,M25.512)  Pain in caitlin [address] 10 x 2  - seated B shoulder extension with [address] 10 x 2   - seated B shoulder ER with [address] 10 x 2   - seated bicep curls with 2lb db's 10 x 2  - seated lat pull downs 30lbs 10 x 3  - seated C-retraction with self OP 2 x 10   - nu-step level 5 x 5 minutes

## 2018-06-15 RX ORDER — ASPIRIN 81 MG/1
81 TABLET ORAL DAILY
Qty: 90 TABLET | Refills: 0 | Status: ON HOLD | OUTPATIENT
Start: 2018-06-15 | End: 2018-07-20

## 2018-06-15 NOTE — TELEPHONE ENCOUNTER
Refill Protocol Appointment Criteria  · Appointment scheduled in the past 6 months or in the next 3 months  Recent Outpatient Visits            2 days ago     [facility] in [facility] [address] Macey Mendez, PT    Office Visit    4 days ago Sabina

## 2018-06-18 ENCOUNTER — OFFICE VISIT (OUTPATIENT)
Dept: PHYSICAL THERAPY | Age: 63
End: 2018-06-18
Attending: FAMILY MEDICINE
Payer: COMMERCIAL

## 2018-06-18 ENCOUNTER — APPOINTMENT (OUTPATIENT)
Dept: LAB | Age: 63
End: 2018-06-18
Attending: FAMILY MEDICINE
Payer: COMMERCIAL

## 2018-06-18 ENCOUNTER — TELEPHONE (OUTPATIENT)
Dept: PHYSICAL THERAPY | Age: 63
End: 2018-06-18

## 2018-06-18 ENCOUNTER — TELEPHONE (OUTPATIENT)
Dept: OTHER | Age: 63
End: 2018-06-18

## 2018-06-18 DIAGNOSIS — N28.9 ABNORMAL KIDNEY FUNCTION: ICD-10-CM

## 2018-06-18 PROCEDURE — 36415 COLL VENOUS BLD VENIPUNCTURE: CPT

## 2018-06-18 PROCEDURE — 80048 BASIC METABOLIC PNL TOTAL CA: CPT

## 2018-06-18 PROCEDURE — 97110 THERAPEUTIC EXERCISES: CPT | Performed by: PHYSICAL THERAPIST

## 2018-06-18 NOTE — TELEPHONE ENCOUNTER
Ishaan Flores from [facility] PT calling stating they had sent at an [facility] request on 6/13/18 requesting additional visits as she is HMO pt. Today is last day of 8 treatments and requesting additional 6 appts to complete her therapy.  The request is attached

## 2018-06-18 NOTE — PROGRESS NOTES
Diagnosis: Bilateral shoulder pain, unspecified chronicity (M25.511,M25.512)  Pain in both knees, unspecified chronicity (M25.561,M25.562)          Next MD visit: none scheduled  Fall Risk: standard         Precautions: n/a          Medication Changes [facility]

## 2018-06-19 ENCOUNTER — TELEPHONE (OUTPATIENT)
Dept: OTHER | Age: 63
End: 2018-06-19

## 2018-06-19 NOTE — TELEPHONE ENCOUNTER
LMTCB please transfer to triage. Noted pt had the BMP done today. I do not see a Nephrology appt? Notes recorded by Hortensia Orona MD on 6/17/2018 at 5:46 PM CDT  Left message on the voice mail  1. Repeat BMP in am ( order placed)  2.  See nephrology ( p

## 2018-06-19 NOTE — TELEPHONE ENCOUNTER
DR Martin Slater: please review BMP done 6/18/18. Patient called and states she would like the results. Also she will call today to schedule nephrology appt as you recommended.

## 2018-06-19 NOTE — TELEPHONE ENCOUNTER
Please see below and advise--Debbie calling to follow up on adding 6 more visits--patient has appt tomorrow. Uday Rutledge states 6 more visits can be added on to original order.     Please respond to pool: HUMAIRA [address] LPN/CMA

## 2018-06-20 ENCOUNTER — OFFICE VISIT (OUTPATIENT)
Dept: PHYSICAL THERAPY | Age: 63
End: 2018-06-20
Attending: FAMILY MEDICINE
Payer: COMMERCIAL

## 2018-06-20 PROCEDURE — 97110 THERAPEUTIC EXERCISES: CPT | Performed by: PHYSICAL THERAPIST

## 2018-06-20 NOTE — PROGRESS NOTES
Diagnosis: Bilateral shoulder pain, unspecified chronicity (M25.511,M25.512)  Pain in both knees, unspecified chronicity (M25.561,M25.562)          Next MD visit: none scheduled  Fall Risk: standard         Precautions: n/a          Medication Changes [facility]

## 2018-06-21 ENCOUNTER — OFFICE VISIT (OUTPATIENT)
Dept: NEPHROLOGY | Facility: CLINIC | Age: 63
End: 2018-06-21

## 2018-06-21 VITALS
BODY MASS INDEX: 57.52 KG/M2 | DIASTOLIC BLOOD PRESSURE: 63 MMHG | HEIGHT: 60 IN | TEMPERATURE: 98 F | WEIGHT: 293 LBS | SYSTOLIC BLOOD PRESSURE: 90 MMHG | HEART RATE: 85 BPM

## 2018-06-21 DIAGNOSIS — N17.9 AKI (ACUTE KIDNEY INJURY) (HCC): Primary | ICD-10-CM

## 2018-06-21 PROCEDURE — 99244 OFF/OP CNSLTJ NEW/EST MOD 40: CPT | Performed by: INTERNAL MEDICINE

## 2018-06-21 PROCEDURE — 99212 OFFICE O/P EST SF 10 MIN: CPT | Performed by: INTERNAL MEDICINE

## 2018-06-21 NOTE — PROGRESS NOTES
Consult Requested By: Dr. Riana Oneal    Reason for Consult: PROSPER     HPI:     Patient is a 58 yrs old female with pmh of DM II x 4 yrs, HTN, morbid obesity, HL, KHOA on CPAP who presented for work up and evaluation of kidney disease     Lab work showe mg by mouth daily.  Alternates with Celebrex ) Disp: 90 tablet Rfl: 0   CELECOXIB 200 MG Oral Cap TAKE ONE CAPSULE BY MOUTH EVERY DAY Disp: 30 capsule Rfl: 1   Potassium Chloride ER (KLOR-CON M10) 10 MEQ Oral Tab CR Take 1 tablet (10 mEq total) by mouth reece PHYSICAL EXAM:     Constitutional: appears well hydrated alert and responsive no acute distress noted  Head/Face: normocephalic  Eyes/Vision: normal extraocular motion is intact  Nose/Mouth/Throat:mucous membranes are moist   Neck/Thyroid: neck is jarquin

## 2018-06-22 PROBLEM — N17.9 AKI (ACUTE KIDNEY INJURY) (HCC): Status: ACTIVE | Noted: 2018-06-22

## 2018-06-23 ENCOUNTER — APPOINTMENT (OUTPATIENT)
Dept: LAB | Age: 63
End: 2018-06-23
Attending: INTERNAL MEDICINE
Payer: COMMERCIAL

## 2018-06-23 DIAGNOSIS — N17.9 AKI (ACUTE KIDNEY INJURY) (HCC): ICD-10-CM

## 2018-06-23 PROCEDURE — 81001 URINALYSIS AUTO W/SCOPE: CPT

## 2018-06-23 PROCEDURE — 84156 ASSAY OF PROTEIN URINE: CPT

## 2018-06-23 PROCEDURE — 82570 ASSAY OF URINE CREATININE: CPT

## 2018-06-25 ENCOUNTER — OFFICE VISIT (OUTPATIENT)
Dept: PHYSICAL THERAPY | Age: 63
End: 2018-06-25
Attending: FAMILY MEDICINE
Payer: COMMERCIAL

## 2018-06-25 PROCEDURE — 97110 THERAPEUTIC EXERCISES: CPT | Performed by: PHYSICAL THERAPIST

## 2018-06-28 ENCOUNTER — APPOINTMENT (OUTPATIENT)
Dept: PHYSICAL THERAPY | Age: 63
End: 2018-06-28
Attending: FAMILY MEDICINE
Payer: COMMERCIAL

## 2018-06-28 ENCOUNTER — TELEPHONE (OUTPATIENT)
Dept: PHYSICAL THERAPY | Facility: HOSPITAL | Age: 63
End: 2018-06-28

## 2018-07-01 RX ORDER — CELECOXIB 200 MG/1
CAPSULE ORAL
Qty: 30 CAPSULE | Refills: 1 | Status: ON HOLD | OUTPATIENT
Start: 2018-07-01 | End: 2018-07-20

## 2018-07-02 ENCOUNTER — APPOINTMENT (OUTPATIENT)
Dept: PHYSICAL THERAPY | Age: 63
End: 2018-07-02
Attending: FAMILY MEDICINE
Payer: COMMERCIAL

## 2018-07-02 NOTE — TELEPHONE ENCOUNTER
Refill Protocol Appointment Criteria  · Appointment scheduled in the past 6 months or in the next 3 months  Recent Outpatient Visits            6 days ago     [facility] in [facility] [address] Lindsay Guadalupe, PT    Office Visit    1 week ago PROSPER (

## 2018-07-05 ENCOUNTER — APPOINTMENT (OUTPATIENT)
Dept: PHYSICAL THERAPY | Age: 63
End: 2018-07-05
Attending: FAMILY MEDICINE
Payer: COMMERCIAL

## 2018-07-05 ENCOUNTER — TELEPHONE (OUTPATIENT)
Dept: PHYSICAL THERAPY | Age: 63
End: 2018-07-05

## 2018-07-10 RX ORDER — ZOLPIDEM TARTRATE 10 MG/1
TABLET ORAL
Qty: 30 TABLET | Refills: 1 | OUTPATIENT
Start: 2018-07-10 | End: 2018-09-18

## 2018-07-10 NOTE — TELEPHONE ENCOUNTER
Please advise office visit and refill and  last refilled on 4/24/18  #30 1 refill       From: Angy Kim  Sent: 7/9/2018 11:02 PM CDT  Subject: Medication Renewal Request    Angy Kim would like a refill of the following medications:

## 2018-07-13 RX ORDER — ATORVASTATIN CALCIUM 20 MG/1
TABLET, FILM COATED ORAL
Qty: 90 TABLET | Refills: 0 | Status: SHIPPED | OUTPATIENT
Start: 2018-07-13 | End: 2018-10-11

## 2018-07-13 NOTE — TELEPHONE ENCOUNTER
Rx approved for 90 days per protocol.     Cholesterol Medications  Protocol Criteria:  · Appointment scheduled in the past 12 months or in the next 3 months  · ALT & LDL on file in the past 12 months  · ALT result < 80  · LDL result <130   Recent Outpatient weeks          Lab Results  Component Value Date   GLU 99 06/18/2018   BUN 36 (H) 06/18/2018   CREATSERUM 1.91 (H) 06/18/2018   BUNCREA 18.8 06/18/2018   GFRNAA 27 (L) 06/18/2018   GFRAA 31 (L) 06/18/2018   CA 9.1 06/18/2018   ALKPHOS 74 02/23/2016   AST 1

## 2018-07-16 ENCOUNTER — APPOINTMENT (OUTPATIENT)
Dept: LAB | Age: 63
End: 2018-07-16
Attending: INTERNAL MEDICINE
Payer: COMMERCIAL

## 2018-07-16 DIAGNOSIS — N17.9 AKI (ACUTE KIDNEY INJURY) (HCC): ICD-10-CM

## 2018-07-16 LAB
ALBUMIN SERPL BCP-MCNC: 3.6 G/DL (ref 3.5–4.8)
ANION GAP SERPL CALC-SCNC: 12 MMOL/L (ref 0–18)
BUN SERPL-MCNC: 101 MG/DL (ref 8–20)
BUN/CREAT SERPL: 23.5 (ref 10–20)
CALCIUM SERPL-MCNC: 9.6 MG/DL (ref 8.5–10.5)
CHLORIDE SERPL-SCNC: 107 MMOL/L (ref 95–110)
CO2 SERPL-SCNC: 19 MMOL/L (ref 22–32)
CREAT SERPL-MCNC: 4.29 MG/DL (ref 0.5–1.5)
GLUCOSE SERPL-MCNC: 134 MG/DL (ref 70–99)
OSMOLALITY UR CALC.SUM OF ELEC: 320 MOSM/KG (ref 275–295)
PHOSPHATE SERPL-MCNC: 4.8 MG/DL (ref 2.4–4.7)
POTASSIUM SERPL-SCNC: 4.5 MMOL/L (ref 3.3–5.1)
SODIUM SERPL-SCNC: 138 MMOL/L (ref 136–144)

## 2018-07-16 PROCEDURE — 80069 RENAL FUNCTION PANEL: CPT

## 2018-07-16 PROCEDURE — 36415 COLL VENOUS BLD VENIPUNCTURE: CPT

## 2018-07-20 ENCOUNTER — OFFICE VISIT (OUTPATIENT)
Dept: NEPHROLOGY | Facility: CLINIC | Age: 63
End: 2018-07-20

## 2018-07-20 ENCOUNTER — HOSPITAL ENCOUNTER (INPATIENT)
Facility: HOSPITAL | Age: 63
LOS: 2 days | Discharge: HOME | DRG: 683 | End: 2018-07-22
Attending: EMERGENCY MEDICINE | Admitting: HOSPITALIST
Payer: COMMERCIAL

## 2018-07-20 ENCOUNTER — APPOINTMENT (OUTPATIENT)
Dept: GENERAL RADIOLOGY | Facility: HOSPITAL | Age: 63
DRG: 683 | End: 2018-07-20
Attending: EMERGENCY MEDICINE
Payer: COMMERCIAL

## 2018-07-20 ENCOUNTER — APPOINTMENT (OUTPATIENT)
Dept: ULTRASOUND IMAGING | Facility: HOSPITAL | Age: 63
DRG: 683 | End: 2018-07-20
Attending: EMERGENCY MEDICINE
Payer: COMMERCIAL

## 2018-07-20 VITALS
BODY MASS INDEX: 57.52 KG/M2 | SYSTOLIC BLOOD PRESSURE: 97 MMHG | HEART RATE: 62 BPM | WEIGHT: 293 LBS | TEMPERATURE: 98 F | HEIGHT: 60 IN | DIASTOLIC BLOOD PRESSURE: 64 MMHG

## 2018-07-20 DIAGNOSIS — N17.9 AKI (ACUTE KIDNEY INJURY) (HCC): Primary | ICD-10-CM

## 2018-07-20 DIAGNOSIS — N30.00 ACUTE CYSTITIS WITHOUT HEMATURIA: ICD-10-CM

## 2018-07-20 DIAGNOSIS — E87.2 LACTIC ACID ACIDOSIS: ICD-10-CM

## 2018-07-20 DIAGNOSIS — N28.9 ACUTE ON CHRONIC RENAL INSUFFICIENCY: Primary | ICD-10-CM

## 2018-07-20 DIAGNOSIS — N18.9 ACUTE ON CHRONIC RENAL INSUFFICIENCY: Primary | ICD-10-CM

## 2018-07-20 PROBLEM — E87.20 LACTIC ACID ACIDOSIS: Status: ACTIVE | Noted: 2018-07-20

## 2018-07-20 LAB
ANION GAP SERPL CALC-SCNC: 11 MMOL/L (ref 0–18)
BASOPHILS # BLD: 0.1 K/UL (ref 0–0.2)
BASOPHILS NFR BLD: 1 %
BILIRUB UR QL: NEGATIVE
BUN SERPL-MCNC: 99 MG/DL (ref 8–20)
BUN/CREAT SERPL: 24.4 (ref 10–20)
CALCIUM SERPL-MCNC: 9.8 MG/DL (ref 8.5–10.5)
CHLORIDE SERPL-SCNC: 106 MMOL/L (ref 95–110)
CO2 SERPL-SCNC: 19 MMOL/L (ref 22–32)
COLOR UR: YELLOW
CREAT SERPL-MCNC: 4.06 MG/DL (ref 0.5–1.5)
EOSINOPHIL # BLD: 0.1 K/UL (ref 0–0.7)
EOSINOPHIL NFR BLD: 1 %
ERYTHROCYTE [DISTWIDTH] IN BLOOD BY AUTOMATED COUNT: 14.6 % (ref 11–15)
GLUCOSE BLDC GLUCOMTR-MCNC: 105 MG/DL (ref 70–99)
GLUCOSE BLDC GLUCOMTR-MCNC: 114 MG/DL (ref 70–99)
GLUCOSE SERPL-MCNC: 131 MG/DL (ref 70–99)
GLUCOSE UR-MCNC: NEGATIVE MG/DL
HCT VFR BLD AUTO: 44.2 % (ref 35–48)
HGB BLD-MCNC: 14.2 G/DL (ref 12–16)
HGB UR QL STRIP.AUTO: NEGATIVE
HYALINE CASTS #/AREA URNS AUTO: 40 /LPF
KETONES UR-MCNC: NEGATIVE MG/DL
LACTATE SERPL-SCNC: 1.5 MMOL/L (ref 0.5–2.2)
LACTATE SERPL-SCNC: 2.8 MMOL/L (ref 0.5–2.2)
LYMPHOCYTES # BLD: 3.2 K/UL (ref 1–4)
LYMPHOCYTES NFR BLD: 27 %
MAGNESIUM SERPL-MCNC: 2 MG/DL (ref 1.8–2.5)
MCH RBC QN AUTO: 29.3 PG (ref 27–32)
MCHC RBC AUTO-ENTMCNC: 32.1 G/DL (ref 32–37)
MCV RBC AUTO: 91.2 FL (ref 80–100)
MONOCYTES # BLD: 1 K/UL (ref 0–1)
MONOCYTES NFR BLD: 8 %
NEUTROPHILS # BLD AUTO: 7.5 K/UL (ref 1.8–7.7)
NEUTROPHILS NFR BLD: 63 %
NITRITE UR QL STRIP.AUTO: NEGATIVE
OSMOLALITY UR CALC.SUM OF ELEC: 315 MOSM/KG (ref 275–295)
PH UR: 5 [PH] (ref 5–8)
PLATELET # BLD AUTO: 198 K/UL (ref 140–400)
PMV BLD AUTO: 9.1 FL (ref 7.4–10.3)
POTASSIUM SERPL-SCNC: 4.5 MMOL/L (ref 3.3–5.1)
PROT UR-MCNC: NEGATIVE MG/DL
RBC # BLD AUTO: 4.85 M/UL (ref 3.7–5.4)
RBC #/AREA URNS AUTO: 1 /HPF
SODIUM SERPL-SCNC: 136 MMOL/L (ref 136–144)
SP GR UR STRIP: 1.01 (ref 1–1.03)
UROBILINOGEN UR STRIP-ACNC: <2
VIT C UR-MCNC: NEGATIVE MG/DL
WBC # BLD AUTO: 12 K/UL (ref 4–11)
WBC #/AREA URNS AUTO: 14 /HPF

## 2018-07-20 PROCEDURE — 99212 OFFICE O/P EST SF 10 MIN: CPT | Performed by: INTERNAL MEDICINE

## 2018-07-20 PROCEDURE — 99255 IP/OBS CONSLTJ NEW/EST HI 80: CPT | Performed by: INTERNAL MEDICINE

## 2018-07-20 PROCEDURE — 71045 X-RAY EXAM CHEST 1 VIEW: CPT | Performed by: EMERGENCY MEDICINE

## 2018-07-20 PROCEDURE — 76775 US EXAM ABDO BACK WALL LIM: CPT | Performed by: EMERGENCY MEDICINE

## 2018-07-20 PROCEDURE — 99223 1ST HOSP IP/OBS HIGH 75: CPT | Performed by: HOSPITALIST

## 2018-07-20 PROCEDURE — 99214 OFFICE O/P EST MOD 30 MIN: CPT | Performed by: INTERNAL MEDICINE

## 2018-07-20 RX ORDER — SODIUM CHLORIDE 9 MG/ML
INJECTION, SOLUTION INTRAVENOUS CONTINUOUS
Status: DISCONTINUED | OUTPATIENT
Start: 2018-07-20 | End: 2018-07-21

## 2018-07-20 RX ORDER — POLYETHYLENE GLYCOL 3350 17 G/17G
17 POWDER, FOR SOLUTION ORAL DAILY PRN
Status: DISCONTINUED | OUTPATIENT
Start: 2018-07-20 | End: 2018-07-22

## 2018-07-20 RX ORDER — ONDANSETRON 2 MG/ML
4 INJECTION INTRAMUSCULAR; INTRAVENOUS EVERY 6 HOURS PRN
Status: DISCONTINUED | OUTPATIENT
Start: 2018-07-20 | End: 2018-07-22

## 2018-07-20 RX ORDER — HEPARIN SODIUM 5000 [USP'U]/ML
5000 INJECTION, SOLUTION INTRAVENOUS; SUBCUTANEOUS EVERY 12 HOURS SCHEDULED
Status: DISCONTINUED | OUTPATIENT
Start: 2018-07-20 | End: 2018-07-22

## 2018-07-20 RX ORDER — SODIUM CHLORIDE 0.9 % (FLUSH) 0.9 %
3 SYRINGE (ML) INJECTION AS NEEDED
Status: DISCONTINUED | OUTPATIENT
Start: 2018-07-20 | End: 2018-07-22

## 2018-07-20 RX ORDER — DEXTROSE MONOHYDRATE 25 G/50ML
50 INJECTION, SOLUTION INTRAVENOUS AS NEEDED
Status: DISCONTINUED | OUTPATIENT
Start: 2018-07-20 | End: 2018-07-22

## 2018-07-20 RX ORDER — MECLIZINE HCL 12.5 MG/1
12.5 TABLET ORAL 3 TIMES DAILY PRN
Status: DISCONTINUED | OUTPATIENT
Start: 2018-07-20 | End: 2018-07-22

## 2018-07-20 RX ORDER — DOCUSATE SODIUM 100 MG/1
100 CAPSULE, LIQUID FILLED ORAL 2 TIMES DAILY
Status: DISCONTINUED | OUTPATIENT
Start: 2018-07-20 | End: 2018-07-22

## 2018-07-20 RX ORDER — ZOLPIDEM TARTRATE 10 MG/1
10 TABLET ORAL NIGHTLY PRN
Status: DISCONTINUED | OUTPATIENT
Start: 2018-07-20 | End: 2018-07-22

## 2018-07-20 RX ORDER — BISACODYL 10 MG
10 SUPPOSITORY, RECTAL RECTAL
Status: DISCONTINUED | OUTPATIENT
Start: 2018-07-20 | End: 2018-07-22

## 2018-07-20 RX ORDER — ACETAMINOPHEN 325 MG/1
650 TABLET ORAL EVERY 6 HOURS PRN
Status: DISCONTINUED | OUTPATIENT
Start: 2018-07-20 | End: 2018-07-22

## 2018-07-20 NOTE — CONSULTS
[facility]    Report of Consultation    Date of Admission:  7/20/2018  Date of Consult:  7/20/2018   Reason for Consultation:     PROSPER     History of Present Illness:     Patient is a 58 yrs old female with pmh of DM II x 4 yrs, HTN, morbi in sodium chloride 0.9 % 100 mL MBP/ADD-vantage 2 g Intravenous Once   sodium chloride 0.9% IV bolus 3,500 mL 3,500 mL Intravenous Once       Allergies    Penicillins                 Comment:Other reaction(s): Rash    Review of Systems:     Constitutional: neck is supple without adenopathy  Lymphatic: no abnormal cervical, supraclavicular adenopathy is noted  Respiratory:  lungs are clear to auscultation bilaterally  Cardiovascular: regular rate and rhythm   Abdomen: soft, non-tender, non-distended  Skin/Alcides to participate in the care of your patient.     Syd Peter MD  7/20/2018

## 2018-07-20 NOTE — PROGRESS NOTES
[facility] Pharmacy Note: Antimicrobial Weight Dose Adjustment for: ceftriaxone (ROCEPHIN)    Kamryn Morris is a 58year old female who has been prescribed ceftriaxone (ROCEPHIN) 1000 mg.   CrCl is estimated creatinine clearance is 10.1 mL/min (A) (based on

## 2018-07-20 NOTE — PROGRESS NOTES
[facility] Pharmacy Note: Antimicrobial Weight Dose Adjustment for: ceftriaxone (ROCEPHIN)    Yolanda Knapp is a 58year old female who has been prescribed ceftriaxone (ROCEPHIN) 1 g every 24 hours.   CrCl is estimated creatinine clearance is 10.1 mL/min (A)

## 2018-07-20 NOTE — PROGRESS NOTES
Progress Note:      Patient is a 58 yrs old female with pmh of DM II x 4 yrs, HTN, morbid obesity, HL, KHOA on CPAP who presented for follow up     Lab work showed BUN/Cr 36/1.9 mg/dl with an eGFR 27 ml/min.  Creatinine was stable at 0.9 mg/dl between 2011 Zolpidem Tartrate 10 MG Oral Tab TAKE 1 TABLET BY MOUTH AT BEDTIME Disp: 30 tablet Rfl: 1   Meclizine HCl 12.5 MG Oral Tab Take 1 tablet (12.5 mg total) by mouth 3 (three) times daily as needed.  Disp: 40 tablet Rfl: 0       Allergies:    Penicillins urine protein unremarkable  - Creatinine was stable at 0.9 mg/dl between 2011 -2017.   - UACR negative. Aic in 6s %.  - initial though PROSPER was secondary to celecoxib - was taking more regularly for last 2 months.  Creatinine continue to get worse despite ce

## 2018-07-20 NOTE — ED PROVIDER NOTES
Patient Seen in: [facility] AND [facility] Emergency Department    History   Patient presents with:  Abnormal Result (metabolic, cardiac)    Stated Complaint: acute kidney injury sent from Dr Lee Helton     HPI    58-year-old female presents for complaint of abnorm HPI.  Constitutional and vital signs reviewed. All other systems reviewed and negative except as noted above.     Physical Exam   ED Triage Vitals  BP: (!) 83/57 [07/20/18 1206]  Pulse: 67 [07/20/18 1205]  Resp: 17 [07/20/18 1205]  Temp: 97.3 °F (36.3 Bacteria Urine Few (*)     All other components within normal limits   LACTIC ACID, PLASMA - Abnormal; Notable for the following:     Lactic Acid 2.8 (*)     All other components within normal limits   CBC W/ DIFFERENTIAL - Abnormal; Notable for the follow however she will be covered. Nephrology has been consulted.     Imaging:   Xr Chest Ap Portable  (cpt=71045)    Result Date: 7/20/2018  PROCEDURE: XR CHEST AP PORTABLE (CPT=71010) TIME: 13:25.   COMPARISON: [facility], X CHEST PORTABLE, 8/0 Prescribed:  Current Discharge Medication List        Present on Admission  Date Reviewed: 6/22/2018          ICD-10-CM Noted POA    Acute on chronic renal insufficiency N28.9, N18.9 7/20/2018 Unknown

## 2018-07-20 NOTE — H&P
[facility]    PATIENT'S NAME: Romy Jones PHYSICIAN: Robert Jerez MD   PATIENT ACCOUNT#:   [id number]    LOCATION:  [place] [address]  MEDICAL RECORD #:   [id number]       YOB: 1955  ADMISSION DATE:       07/ chills, but she feels generally fatigued and very tired. Symptoms mainly exacerbated when she stands up. Other 12-point review of systems is negative. PHYSICAL EXAMINATION:    GENERAL:  Alert. Oriented to time, place, and person.   Moderate distress

## 2018-07-21 LAB
ANION GAP SERPL CALC-SCNC: 6 MMOL/L (ref 0–18)
BASOPHILS # BLD: 0.1 K/UL (ref 0–0.2)
BASOPHILS NFR BLD: 1 %
BUN SERPL-MCNC: 81 MG/DL (ref 8–20)
BUN/CREAT SERPL: 29.5 (ref 10–20)
CALCIUM SERPL-MCNC: 8.6 MG/DL (ref 8.5–10.5)
CHLORIDE SERPL-SCNC: 117 MMOL/L (ref 95–110)
CO2 SERPL-SCNC: 17 MMOL/L (ref 22–32)
CREAT SERPL-MCNC: 2.75 MG/DL (ref 0.5–1.5)
EOSINOPHIL # BLD: 0.2 K/UL (ref 0–0.7)
EOSINOPHIL NFR BLD: 3 %
ERYTHROCYTE [DISTWIDTH] IN BLOOD BY AUTOMATED COUNT: 14.8 % (ref 11–15)
GLUCOSE BLDC GLUCOMTR-MCNC: 114 MG/DL (ref 70–99)
GLUCOSE BLDC GLUCOMTR-MCNC: 118 MG/DL (ref 70–99)
GLUCOSE BLDC GLUCOMTR-MCNC: 135 MG/DL (ref 70–99)
GLUCOSE BLDC GLUCOMTR-MCNC: 93 MG/DL (ref 70–99)
GLUCOSE SERPL-MCNC: 97 MG/DL (ref 70–99)
HCT VFR BLD AUTO: 36.2 % (ref 35–48)
HGB BLD-MCNC: 11.8 G/DL (ref 12–16)
LYMPHOCYTES # BLD: 2.2 K/UL (ref 1–4)
LYMPHOCYTES NFR BLD: 29 %
MCH RBC QN AUTO: 29.4 PG (ref 27–32)
MCHC RBC AUTO-ENTMCNC: 32.5 G/DL (ref 32–37)
MCV RBC AUTO: 90.4 FL (ref 80–100)
MONOCYTES # BLD: 0.7 K/UL (ref 0–1)
MONOCYTES NFR BLD: 9 %
NEUTROPHILS # BLD AUTO: 4.4 K/UL (ref 1.8–7.7)
NEUTROPHILS NFR BLD: 58 %
OSMOLALITY UR CALC.SUM OF ELEC: 314 MOSM/KG (ref 275–295)
PLATELET # BLD AUTO: 170 K/UL (ref 140–400)
PMV BLD AUTO: 9.2 FL (ref 7.4–10.3)
POTASSIUM SERPL-SCNC: 4.6 MMOL/L (ref 3.3–5.1)
RBC # BLD AUTO: 4 M/UL (ref 3.7–5.4)
SODIUM SERPL-SCNC: 140 MMOL/L (ref 136–144)
WBC # BLD AUTO: 7.5 K/UL (ref 4–11)

## 2018-07-21 PROCEDURE — 99232 SBSQ HOSP IP/OBS MODERATE 35: CPT | Performed by: HOSPITALIST

## 2018-07-21 PROCEDURE — 99233 SBSQ HOSP IP/OBS HIGH 50: CPT | Performed by: INTERNAL MEDICINE

## 2018-07-21 NOTE — PROGRESS NOTES
[facility]  Progress Note     Marizol Jiménez  : 4/10/1955    Status: Inpatient  Day #: 1    Attending: Liz Flaherty MD  PCP: Maite Arnold MD      Assessment and Plan     Acute renal failure - Hypovolemia   -hold lasix, hold lisinopril 4.6   CL  107  106  117*   CO2  19*  19*  17*   GLU  134*  131*  97   MG   --   2.0   --    PHOS  4.8*   --    --        Xr Chest Ap Portable  (cpt=71045)    Result Date: 7/20/2018  CONCLUSION:  1. Suboptimal inspiration.  Marked chronic elevation right hem

## 2018-07-21 NOTE — PROGRESS NOTES
[facility]    Progress Note      Subjective:     Feels better today - no dizziness.       Review of Systems:     Constitutional:  Negative for decreased activity, fever, irritability and lethargy  ENMT:  Negative for ear drainage, hearing abnormalities noted  Musculoskeletal:  no deformities  Extremities: no edema  Neurological:  Grossly normal    Medications:    Current Facility-Administered Medications:  dextrose 50 % injection 50 mL 50 mL Intravenous PRN   Glucose-Vitamin C (DEX-4) 4-0.0 07/16/18   1159   PHOS  4.8*   ALB  3.6       Xr Chest Ap Portable  (cpt=71045)    Result Date: 7/20/2018  CONCLUSION:  1. Suboptimal inspiration. Marked chronic elevation right hemidiaphragm. Mild bibasilar atelectasis right more than left.  No large conso

## 2018-07-22 VITALS
OXYGEN SATURATION: 94 % | SYSTOLIC BLOOD PRESSURE: 111 MMHG | BODY MASS INDEX: 57.52 KG/M2 | RESPIRATION RATE: 22 BRPM | WEIGHT: 293 LBS | TEMPERATURE: 98 F | HEART RATE: 99 BPM | DIASTOLIC BLOOD PRESSURE: 66 MMHG | HEIGHT: 60 IN

## 2018-07-22 LAB
ALBUMIN SERPL BCP-MCNC: 3.2 G/DL (ref 3.5–4.8)
ANION GAP SERPL CALC-SCNC: 7 MMOL/L (ref 0–18)
BUN SERPL-MCNC: 49 MG/DL (ref 8–20)
BUN/CREAT SERPL: 26.9 (ref 10–20)
CALCIUM SERPL-MCNC: 9.1 MG/DL (ref 8.5–10.5)
CHLORIDE SERPL-SCNC: 114 MMOL/L (ref 95–110)
CO2 SERPL-SCNC: 20 MMOL/L (ref 22–32)
CREAT SERPL-MCNC: 1.82 MG/DL (ref 0.5–1.5)
GLUCOSE BLDC GLUCOMTR-MCNC: 104 MG/DL (ref 70–99)
GLUCOSE SERPL-MCNC: 111 MG/DL (ref 70–99)
OSMOLALITY UR CALC.SUM OF ELEC: 306 MOSM/KG (ref 275–295)
PHOSPHATE SERPL-MCNC: 3.1 MG/DL (ref 2.4–4.7)
POTASSIUM SERPL-SCNC: 4.5 MMOL/L (ref 3.3–5.1)
SODIUM SERPL-SCNC: 141 MMOL/L (ref 136–144)

## 2018-07-22 PROCEDURE — 99239 HOSP IP/OBS DSCHRG MGMT >30: CPT | Performed by: HOSPITALIST

## 2018-07-22 PROCEDURE — 99233 SBSQ HOSP IP/OBS HIGH 50: CPT | Performed by: INTERNAL MEDICINE

## 2018-07-22 RX ORDER — CEFADROXIL 500 MG/1
500 CAPSULE ORAL 2 TIMES DAILY
Qty: 10 CAPSULE | Refills: 0 | Status: SHIPPED | OUTPATIENT
Start: 2018-07-22 | End: 2018-07-27

## 2018-07-22 NOTE — DISCHARGE SUMMARY
[facility]  Discharge Summary     Samantha Munoz  : 4/10/1955    Status: Inpatient  Day #: 2    Attending: Robin Arcos MD  PCP: Lori Grier MD     Date of Admission: 2018  Date of Discharge: 2018     Hospital Discharge Nikki cyanosis, no clubbing  Neurologic:  nonfocal  Psychiatric:  Normal affect, calm and appropriate  Skin:  No rash, no lesion         Discharge Medications      START taking these medications      Instructions Prescription details   Cefadroxil 500 MG Caps  Co Follow-Up Recommendation:  LACE 29-58: Moderate Risk of readmission after discharge from the hospital.        I spent >30 minutes on this discharge, counseling patient and discussing discharge plans. All questions answered.       Liz Flaherty MD

## 2018-07-22 NOTE — PROGRESS NOTES
[facility]  Progress Note      Span  : 4/10/1955    Status: Inpatient  Day #: 2    Attending: Noreen Rodriguez MD  PCP: Thompson Cruz MD      Assessment and Plan     Acute renal failure - Hypovolemia   -appreciate nephrology consu BUN  101*  99*  81*  49*   CREATSERUM  4.29*  4.06*  2.75*  1.82*   GFRAA  12*  13*  20*  33*   GFRNAA  10*  11*  18*  29*   CA  9.6  9.8  8.6  9.1   ALB  3.6   --    --   3.2*   NA  138  136  140  141   K  4.5  4.5  4.6  4.5   CL  107  106  117*  114*

## 2018-07-22 NOTE — PROGRESS NOTES
[place] [facility] - [facility]    Progress Note      Subjective:     Feels better today - no dizziness. Improve appetite.      Weight gain noted       Review of Systems:     Constitutional:  Negative for decreased activity, fever, irritability and lethargy  E abnormal bruising noted  Back/Spine: no abnormalities noted  Musculoskeletal:  no deformities  Extremities: no edema  Neurological:  Grossly normal    Medications:    Current Facility-Administered Medications:  metoprolol Tartrate (LOPRESSOR) tab 12.5 mg 1 hours.   Recent Labs   Lab  07/16/18   1159  07/20/18   1238  07/22/18   0650   MG   --   2.0   --    PHOS  4.8*   --   3.1        Recent Labs   Lab  07/16/18   1159  07/22/18   0650   PHOS  4.8*  3.1   ALB  3.6  3.2*       Xr Chest Ap Portable  (cpt=71045)

## 2018-07-23 ENCOUNTER — TELEPHONE (OUTPATIENT)
Dept: NEPHROLOGY | Facility: CLINIC | Age: 63
End: 2018-07-23

## 2018-07-23 NOTE — TELEPHONE ENCOUNTER
Pt scheduled hosp follow up appt with Dr. Atif Tariq for 7/31/18. Pt states she was supposed to have labs done before this appt. Please call once orders are entered.

## 2018-07-23 NOTE — TELEPHONE ENCOUNTER
Reviewed RSA's progress note from 7/22 in hospital. Per RSA, pt is to repeat BMP prior to her OV. (Dr. Lilian Crockett already entered this order). Pt notified.

## 2018-07-26 ENCOUNTER — APPOINTMENT (OUTPATIENT)
Dept: LAB | Age: 63
End: 2018-07-26
Attending: HOSPITALIST
Payer: COMMERCIAL

## 2018-07-26 DIAGNOSIS — N18.9 ACUTE ON CHRONIC RENAL INSUFFICIENCY: ICD-10-CM

## 2018-07-26 DIAGNOSIS — N28.9 ACUTE ON CHRONIC RENAL INSUFFICIENCY: ICD-10-CM

## 2018-07-26 LAB
ANION GAP SERPL CALC-SCNC: 7 MMOL/L (ref 0–18)
BUN SERPL-MCNC: 21 MG/DL (ref 8–20)
BUN/CREAT SERPL: 15.3 (ref 10–20)
CALCIUM SERPL-MCNC: 9.5 MG/DL (ref 8.5–10.5)
CHLORIDE SERPL-SCNC: 109 MMOL/L (ref 95–110)
CO2 SERPL-SCNC: 24 MMOL/L (ref 22–32)
CREAT SERPL-MCNC: 1.37 MG/DL (ref 0.5–1.5)
GLUCOSE SERPL-MCNC: 141 MG/DL (ref 70–99)
OSMOLALITY UR CALC.SUM OF ELEC: 295 MOSM/KG (ref 275–295)
POTASSIUM SERPL-SCNC: 4.1 MMOL/L (ref 3.3–5.1)
SODIUM SERPL-SCNC: 140 MMOL/L (ref 136–144)

## 2018-07-26 PROCEDURE — 36415 COLL VENOUS BLD VENIPUNCTURE: CPT

## 2018-07-26 PROCEDURE — 80048 BASIC METABOLIC PNL TOTAL CA: CPT

## 2018-07-31 ENCOUNTER — OFFICE VISIT (OUTPATIENT)
Dept: NEPHROLOGY | Facility: CLINIC | Age: 63
End: 2018-07-31

## 2018-07-31 VITALS
SYSTOLIC BLOOD PRESSURE: 105 MMHG | TEMPERATURE: 98 F | WEIGHT: 293 LBS | DIASTOLIC BLOOD PRESSURE: 72 MMHG | BODY MASS INDEX: 57.52 KG/M2 | HEIGHT: 60 IN | HEART RATE: 76 BPM

## 2018-07-31 DIAGNOSIS — N17.9 AKI (ACUTE KIDNEY INJURY) (HCC): Primary | ICD-10-CM

## 2018-07-31 PROCEDURE — 99214 OFFICE O/P EST MOD 30 MIN: CPT | Performed by: INTERNAL MEDICINE

## 2018-07-31 PROCEDURE — 99212 OFFICE O/P EST SF 10 MIN: CPT | Performed by: INTERNAL MEDICINE

## 2018-07-31 PROCEDURE — 1111F DSCHRG MED/CURRENT MED MERGE: CPT | Performed by: INTERNAL MEDICINE

## 2018-07-31 RX ORDER — FUROSEMIDE 20 MG/1
20 TABLET ORAL EVERY OTHER DAY
Qty: 15 TABLET | Refills: 0 | Status: SHIPPED | OUTPATIENT
Start: 2018-07-31 | End: 2018-08-14

## 2018-07-31 NOTE — PROGRESS NOTES
Progress Note:      Patient is a 58 yrs old female with pmh of DM II x 4 yrs, HTN, morbid obesity, HL, KHOA on CPAP who presented for follow up after hospitalization     Patient was hospitalized for PROSPER with creatinine at 4.2 mg/dl with UA +ve for hyaline REPLACES ROSUVASTATIN 10MG Disp: 90 tablet Rfl: 0   METOPROLOL TARTRATE 25 MG Oral Tab TAKE 1/2 TABLET BY MOUTH 2 TIMES PER DAY Disp: 90 tablet Rfl: 0   Zolpidem Tartrate 10 MG Oral Tab TAKE 1 TABLET BY MOUTH AT BEDTIME Disp: 30 tablet Rfl: 1   Meclizine H Grossly normal       ASSESSMENT/PLAN:     1. PROSPER:   - baseline creatinine at 0.9 mg/dl with an eGFR >60 ml/min between 2011 -2017.   - Lab work showed BUN/Cr 36/1.9 mg/dl with an eGFR 27 ml/min -> 4.29 mg/dl  - PROSPER secondary to volume depletion in light of

## 2018-07-31 NOTE — PATIENT INSTRUCTIONS
Follow up in 2 weeks   Lab test prior to next visit   Daily weight - bring on next visit   Resume lasix 20 mg every other day

## 2018-08-03 ENCOUNTER — OFFICE VISIT (OUTPATIENT)
Dept: FAMILY MEDICINE CLINIC | Facility: CLINIC | Age: 63
End: 2018-08-03

## 2018-08-03 VITALS
DIASTOLIC BLOOD PRESSURE: 84 MMHG | HEIGHT: 60 IN | HEART RATE: 72 BPM | SYSTOLIC BLOOD PRESSURE: 108 MMHG | WEIGHT: 293 LBS | TEMPERATURE: 97 F | BODY MASS INDEX: 57.52 KG/M2

## 2018-08-03 DIAGNOSIS — Z09 HOSPITAL DISCHARGE FOLLOW-UP: Primary | ICD-10-CM

## 2018-08-03 PROCEDURE — 99495 TRANSJ CARE MGMT MOD F2F 14D: CPT | Performed by: FAMILY MEDICINE

## 2018-08-11 ENCOUNTER — APPOINTMENT (OUTPATIENT)
Dept: LAB | Age: 63
End: 2018-08-11
Attending: INTERNAL MEDICINE
Payer: COMMERCIAL

## 2018-08-11 DIAGNOSIS — N17.9 AKI (ACUTE KIDNEY INJURY) (HCC): ICD-10-CM

## 2018-08-11 LAB
ALBUMIN SERPL BCP-MCNC: 3.5 G/DL (ref 3.5–4.8)
ANION GAP SERPL CALC-SCNC: 9 MMOL/L (ref 0–18)
BUN SERPL-MCNC: 13 MG/DL (ref 8–20)
BUN/CREAT SERPL: 10.8 (ref 10–20)
CALCIUM SERPL-MCNC: 9.1 MG/DL (ref 8.5–10.5)
CHLORIDE SERPL-SCNC: 104 MMOL/L (ref 95–110)
CO2 SERPL-SCNC: 25 MMOL/L (ref 22–32)
CREAT SERPL-MCNC: 1.2 MG/DL (ref 0.5–1.5)
GLUCOSE SERPL-MCNC: 108 MG/DL (ref 70–99)
OSMOLALITY UR CALC.SUM OF ELEC: 287 MOSM/KG (ref 275–295)
PHOSPHATE SERPL-MCNC: 3.7 MG/DL (ref 2.4–4.7)
POTASSIUM SERPL-SCNC: 3.7 MMOL/L (ref 3.3–5.1)
SODIUM SERPL-SCNC: 138 MMOL/L (ref 136–144)

## 2018-08-11 PROCEDURE — 80069 RENAL FUNCTION PANEL: CPT

## 2018-08-11 PROCEDURE — 36415 COLL VENOUS BLD VENIPUNCTURE: CPT

## 2018-08-14 ENCOUNTER — OFFICE VISIT (OUTPATIENT)
Dept: NEPHROLOGY | Facility: CLINIC | Age: 63
End: 2018-08-14

## 2018-08-14 VITALS
HEART RATE: 76 BPM | BODY MASS INDEX: 57.52 KG/M2 | SYSTOLIC BLOOD PRESSURE: 118 MMHG | HEIGHT: 60 IN | WEIGHT: 293 LBS | DIASTOLIC BLOOD PRESSURE: 76 MMHG

## 2018-08-14 DIAGNOSIS — N17.9 AKI (ACUTE KIDNEY INJURY) (HCC): Primary | ICD-10-CM

## 2018-08-14 PROCEDURE — 99212 OFFICE O/P EST SF 10 MIN: CPT | Performed by: INTERNAL MEDICINE

## 2018-08-14 PROCEDURE — 99214 OFFICE O/P EST MOD 30 MIN: CPT | Performed by: INTERNAL MEDICINE

## 2018-08-14 RX ORDER — FUROSEMIDE 20 MG/1
20 TABLET ORAL DAILY
Qty: 30 TABLET | Refills: 0 | Status: SHIPPED | OUTPATIENT
Start: 2018-08-14 | End: 2018-09-18

## 2018-08-14 NOTE — PROGRESS NOTES
Progress Note:      Patient is a 58 yrs old female with pmh of DM II x 4 yrs, HTN, morbid obesity, HL, KHOA on CPAP who presented for follow up after hospitalization     Patient was hospitalized for PROSPER with creatinine at 4.2 mg/dl with UA +ve for hyaline 1 TABLET BY MOUTH EVERY DAY AT BEDTIME .THIS REPLACES ROSUVASTATIN 10MG Disp: 90 tablet Rfl: 0   METOPROLOL TARTRATE 25 MG Oral Tab TAKE 1/2 TABLET BY MOUTH 2 TIMES PER DAY Disp: 90 tablet Rfl: 0   Zolpidem Tartrate 10 MG Oral Tab TAKE 1 TABLET BY MOUTH AT edema  Neurological:  Grossly normal       ASSESSMENT/PLAN:     1.  PROSPER:   - baseline creatinine at 0.9 mg/dl with an eGFR >60 ml/min between 2011 -2017.  - creatinine peaked at 4.3 mg/dl - UA had +ve hyaline cast.   - creatinine improved 1.4 mg/dl with an

## 2018-08-31 ENCOUNTER — APPOINTMENT (OUTPATIENT)
Dept: LAB | Age: 63
End: 2018-08-31
Attending: INTERNAL MEDICINE
Payer: COMMERCIAL

## 2018-08-31 DIAGNOSIS — N17.9 AKI (ACUTE KIDNEY INJURY) (HCC): ICD-10-CM

## 2018-08-31 LAB
ALBUMIN SERPL BCP-MCNC: 3.8 G/DL (ref 3.5–4.8)
ANION GAP SERPL CALC-SCNC: 11 MMOL/L (ref 0–18)
BUN SERPL-MCNC: 12 MG/DL (ref 8–20)
BUN/CREAT SERPL: 11.5 (ref 10–20)
CALCIUM SERPL-MCNC: 9.2 MG/DL (ref 8.5–10.5)
CHLORIDE SERPL-SCNC: 104 MMOL/L (ref 95–110)
CO2 SERPL-SCNC: 27 MMOL/L (ref 22–32)
CREAT SERPL-MCNC: 1.04 MG/DL (ref 0.5–1.5)
GLUCOSE SERPL-MCNC: 106 MG/DL (ref 70–99)
OSMOLALITY UR CALC.SUM OF ELEC: 294 MOSM/KG (ref 275–295)
PHOSPHATE SERPL-MCNC: 4 MG/DL (ref 2.4–4.7)
POTASSIUM SERPL-SCNC: 3.2 MMOL/L (ref 3.3–5.1)
SODIUM SERPL-SCNC: 142 MMOL/L (ref 136–144)

## 2018-08-31 PROCEDURE — 36415 COLL VENOUS BLD VENIPUNCTURE: CPT

## 2018-08-31 PROCEDURE — 80069 RENAL FUNCTION PANEL: CPT

## 2018-09-04 ENCOUNTER — TELEPHONE (OUTPATIENT)
Dept: NEPHROLOGY | Facility: CLINIC | Age: 63
End: 2018-09-04

## 2018-09-04 ENCOUNTER — TELEPHONE (OUTPATIENT)
Dept: CASE MANAGEMENT | Age: 63
End: 2018-09-04

## 2018-09-04 DIAGNOSIS — Z12.11 ENCOUNTER FOR SCREENING COLONOSCOPY: Primary | ICD-10-CM

## 2018-09-04 DIAGNOSIS — E87.6 HYPOKALEMIA: Primary | ICD-10-CM

## 2018-09-04 RX ORDER — POTASSIUM CHLORIDE 1.5 G/1.77G
20 POWDER, FOR SOLUTION ORAL DAILY
Qty: 90 TABLET | Refills: 3 | Status: SHIPPED | OUTPATIENT
Start: 2018-09-04 | End: 2019-08-28

## 2018-09-04 NOTE — TELEPHONE ENCOUNTER
LOV 8/3/18. Patient is due for colorectal screening. Please order FIT or colonoscopy if appropriate. Thank you.

## 2018-09-04 NOTE — TELEPHONE ENCOUNTER
Pt returned call. Notified her of RSA's results message below. Advised to start potassium 20 meq daily and repeat potassium level in 2 weeks. She states she has continued to lose weight. She was 321.7 lb at home this morning.  She was 332 lb on 8/14/18 when

## 2018-09-04 NOTE — TELEPHONE ENCOUNTER
Potassium low at  3.2 - secondary to lasix    Take potassium 20 meq daily tablet - ordered    Repeat serum potassium in 2 weeks - ordered     Pls ask about her weights

## 2018-09-07 ENCOUNTER — TELEPHONE (OUTPATIENT)
Dept: FAMILY MEDICINE CLINIC | Facility: CLINIC | Age: 63
End: 2018-09-07

## 2018-09-07 DIAGNOSIS — G47.33 OBSTRUCTIVE SLEEP APNEA: Primary | ICD-10-CM

## 2018-09-17 NOTE — TELEPHONE ENCOUNTER
Alan Lepe,     Patient needs a renewed referral for DME supplies, please sign referral for processing.      Thank you,   Sharyle Kand

## 2018-09-18 ENCOUNTER — TELEPHONE (OUTPATIENT)
Dept: FAMILY MEDICINE CLINIC | Facility: CLINIC | Age: 63
End: 2018-09-18

## 2018-09-18 DIAGNOSIS — M25.511 BILATERAL SHOULDER PAIN, UNSPECIFIED CHRONICITY: Primary | ICD-10-CM

## 2018-09-18 DIAGNOSIS — M25.561 PAIN IN BOTH KNEES, UNSPECIFIED CHRONICITY: ICD-10-CM

## 2018-09-18 DIAGNOSIS — M25.512 BILATERAL SHOULDER PAIN, UNSPECIFIED CHRONICITY: Primary | ICD-10-CM

## 2018-09-18 DIAGNOSIS — M25.562 PAIN IN BOTH KNEES, UNSPECIFIED CHRONICITY: ICD-10-CM

## 2018-09-18 RX ORDER — FUROSEMIDE 20 MG/1
20 TABLET ORAL DAILY
Qty: 90 TABLET | Refills: 0 | Status: SHIPPED | OUTPATIENT
Start: 2018-09-18 | End: 2018-10-17

## 2018-09-18 RX ORDER — ZOLPIDEM TARTRATE 10 MG/1
TABLET ORAL
Qty: 30 TABLET | Refills: 1 | OUTPATIENT
Start: 2018-09-18 | End: 2019-01-13

## 2018-09-18 RX ORDER — FUROSEMIDE 20 MG/1
TABLET ORAL
Qty: 90 TABLET | Refills: 0 | Status: ON HOLD | OUTPATIENT
Start: 2018-09-18 | End: 2019-03-22

## 2018-09-18 NOTE — TELEPHONE ENCOUNTER
Rx for furosemide was last filled on 8/14/18 by Dr. Julio Crain. Both prescriptions were pended and routed to Dr. Yash Florenec for review.

## 2018-09-18 NOTE — TELEPHONE ENCOUNTER
Patient requesting refill for     furosemide 20 MG Oral Tab Take 1 tablet (20 mg total) by mouth daily.  Disp: 30 tablet Rfl: 0   Zolpidem Tartrate 10 MG Oral Tab TAKE 1 TABLET BY MOUTH AT BEDTIME Disp: 30 tablet Rfl: 1     Pharmacy sent request 3 days ago

## 2018-09-18 NOTE — TELEPHONE ENCOUNTER
Patient states was advised by Dr. Hermelinda Sanders to call and request new referral for PT when Ready.      Patient requesting referral ASAP for Knees and Shoulders

## 2018-09-21 RX ORDER — ZOLPIDEM TARTRATE 10 MG/1
TABLET ORAL
Qty: 30 TABLET | Refills: 1 | OUTPATIENT
Start: 2018-09-21

## 2018-09-22 ENCOUNTER — APPOINTMENT (OUTPATIENT)
Dept: LAB | Age: 63
End: 2018-09-22
Attending: INTERNAL MEDICINE
Payer: COMMERCIAL

## 2018-09-22 DIAGNOSIS — E87.6 HYPOKALEMIA: ICD-10-CM

## 2018-09-22 LAB — POTASSIUM SERPL-SCNC: 3.8 MMOL/L (ref 3.3–5.1)

## 2018-09-22 PROCEDURE — 84132 ASSAY OF SERUM POTASSIUM: CPT

## 2018-09-22 PROCEDURE — 36415 COLL VENOUS BLD VENIPUNCTURE: CPT

## 2018-09-27 RX ORDER — METFORMIN HYDROCHLORIDE 500 MG/1
TABLET, EXTENDED RELEASE ORAL
Qty: 180 TABLET | Refills: 0 | Status: SHIPPED | OUTPATIENT
Start: 2018-09-27 | End: 2019-01-29

## 2018-10-11 RX ORDER — ATORVASTATIN CALCIUM 20 MG/1
TABLET, FILM COATED ORAL
Qty: 90 TABLET | Refills: 0 | Status: SHIPPED | OUTPATIENT
Start: 2018-10-11 | End: 2019-01-07

## 2018-10-11 NOTE — SEPSIS REASSESSMENT
Chief Complaint:   Chief Complaint   Patient presents with     Clinic Care Coordination - Follow-up     Patient here today for nail trimming.      Subjective: Katherin is a 76 year old female who presents to the clinic today for toenail trim. No new lower extremity concerns. She would like toenails trimmed as she is unable to do this herself. Right lateral hallux toenail continues to ingrow into border. A couple nails were catching on stockings, but none ripped off.  No drainage or open wound. Denies toenail pain, rashes, increased swelling, lower leg, ankle or foot pain.     Allergies   Allergen Reactions     Ace Inhibitors Cough     Baclofen Other (See Comments)     Constipation , tiredness     Penicillins      10/7/14 Hives per patient -- many many years ago       Nickel Swelling and Rash     Other reaction(s): Unknown     Current Outpatient Rx   Medication Sig Dispense Refill     Steele 250 MG TABS Take 250 mg by mouth every morning        apixaban ANTICOAGULANT (ELIQUIS) 5 MG tablet Take 1 tablet (5 mg) by mouth 2 times daily 180 tablet 3     calcium carbonate-vitamin D (CALCIUM + D) 600-200 MG-UNIT TABS Take 1 tablet by mouth At Bedtime        carbidopa-levodopa (SINEMET)  MG per tablet TAKE 2 TABLETS FOUR TIMES A  tablet 3     carbidopa-levodopa (SINEMET)  MG per tablet TAKE 2 TABLETS FOUR TIMES A DAY (Patient taking differently: Take 2 tablets by mouth 4 times daily as needed (legs) Takes as needed throughout the day, up to 4 times daily) 720 tablet 3     carvedilol (COREG) 6.25 MG tablet Take 1 tablet (6.25 mg) by mouth 2 times daily (with meals) 180 tablet 3     chlorhexidine (PERIDEX) 0.12 % solution        clopidogrel (PLAVIX) 75 MG tablet Take 1 tablet (75 mg) by mouth daily 90 tablet 3     Cyanocobalamin (VITAMIN B-12 PO) Take 1 tablet by mouth every morning        isosorbide mononitrate (IMDUR) 30 MG 24 hr tablet Take 1 tablet (30 mg) by mouth daily 90 tablet 3     levothyroxine  [facility]      Sepsis Reassessment Note    BP 92/63   Pulse 71   Temp 97.3 °F (36.3 °C) (Oral)   Resp 20   Ht 152.4 cm (5')   Wt (!) 147.9 kg   SpO2 96%   BMI 63.67 kg/m²      3:07 PM    Cardiac:  Regularity: Regular  Rate: Normal  Heart (SYNTHROID/LEVOTHROID) 112 MCG tablet TAKE 1 TABLET DAILY 90 tablet 1     Multiple Vitamins-Minerals (DAILY MULTI) TABS Take 1 tablet by mouth every morning        nitroglycerin (NITROSTAT) 0.4 MG SL tablet DISSOLVE ONE TABLET UNDER THE TONGUE EVERY 5 MINUTES AS NEEDED FOR CHEST PAIN.  DO NOT EXCEED A TOTAL OF 3 DOSES IN 15 MINUTES 25 tablet 3     omeprazole (PRILOSEC) 20 MG CR capsule TAKE 2 CAPSULES DAILY 30 TO 60 MINUTES BEFORE A MEAL 180 capsule 1     rosuvastatin (CRESTOR) 20 MG tablet Take 1 tablet (20 mg) by mouth daily 90 tablet 3     tiZANidine (ZANAFLEX) 2 MG tablet Take 1 tablet (2 mg) by mouth 3 times daily 30 tablet 0     amoxicillin (AMOXIL) 500 MG capsule          Objective:   There were no vitals taken for this visit.  General: Patient is well groomed, appears stated age, is alert, cooperative and in no acute distress.  Vascular: DP pulses are 2/4 and PT pulses are non-palpable bilaterally. LE capillary refill time is <3 seconds. Absent pedal hair. Mild non-pitting LE edema.  MSK: Dorsally contracted digits 2-5 that are non-reducible, very limited passive ROM. Equinus present.  Neurologic: Gross sensation intact to bilateral LE.   Skin: LE skin color, texture and turgor are normal. Toenails are slightly elongated bilaterally. Right lateral hallux nail is ingrown mildly, nail border is mildly edematous. No erythema, drainage, pain or calor.      Assessment:   - Elongated toenails  - Ingrown R lateral hallux toenail  - Chronic anticoagulant use for atrial fibrillation  - Peripheral arterial disease with history of lower leg ulceration      Plan:   - Toenails trimmed x 10. Slantback performed to right lateral hallux nail  - Discussed possibility of nail avulsion to R lateral hallux toenail, likely with matrixectomy. She will consider this.  - Pt to return to clinic in 2-3 months if needed, she will call for appt.

## 2018-10-16 ENCOUNTER — OFFICE VISIT (OUTPATIENT)
Dept: PHYSICAL THERAPY | Age: 63
End: 2018-10-16
Attending: FAMILY MEDICINE
Payer: COMMERCIAL

## 2018-10-16 DIAGNOSIS — M25.561 PAIN IN BOTH KNEES, UNSPECIFIED CHRONICITY: ICD-10-CM

## 2018-10-16 DIAGNOSIS — M25.562 PAIN IN BOTH KNEES, UNSPECIFIED CHRONICITY: ICD-10-CM

## 2018-10-16 DIAGNOSIS — M25.512 BILATERAL SHOULDER PAIN, UNSPECIFIED CHRONICITY: ICD-10-CM

## 2018-10-16 DIAGNOSIS — M25.511 BILATERAL SHOULDER PAIN, UNSPECIFIED CHRONICITY: ICD-10-CM

## 2018-10-16 PROCEDURE — 97530 THERAPEUTIC ACTIVITIES: CPT | Performed by: PHYSICAL THERAPIST

## 2018-10-16 PROCEDURE — 97162 PT EVAL MOD COMPLEX 30 MIN: CPT | Performed by: PHYSICAL THERAPIST

## 2018-10-16 NOTE — PROGRESS NOTES
P.T. EVALUATION:   Referring Physician: Dr. Autumn Scott  Diagnosis: Bilateral shoulder pain, unspecified chronicity (M25.511,M25.512)  Pain in both knees, unspecified chronicity (M25.561,M25.562)    Date of Onset: 9/18/2018 Date of Service: 10/16/2018     WAYNE strength is grossly 4/5. Sensation: WNL throughout    Posture: Some slouching during relaxed sitting and standing    Balance: WFL during static standing activities    Gait: Walks independently with cane. Uses a rolling walker when outside.  Demonstrates co-sign or sign and return this letter via fax as soon as possible to [phone].  If you have any questions, please contact me at Dept: [phone]    Sincerely,  Electronically signed by therapist: Haily Redd, PT    Physician's certification requir

## 2018-10-17 ENCOUNTER — TELEPHONE (OUTPATIENT)
Dept: OTHER | Age: 63
End: 2018-10-17

## 2018-10-17 ENCOUNTER — TELEPHONE (OUTPATIENT)
Dept: GASTROENTEROLOGY | Facility: CLINIC | Age: 63
End: 2018-10-17

## 2018-10-17 ENCOUNTER — TELEPHONE (OUTPATIENT)
Dept: PHYSICAL THERAPY | Age: 63
End: 2018-10-17

## 2018-10-17 ENCOUNTER — OFFICE VISIT (OUTPATIENT)
Dept: GASTROENTEROLOGY | Facility: CLINIC | Age: 63
End: 2018-10-17

## 2018-10-17 VITALS
BODY MASS INDEX: 59.07 KG/M2 | HEIGHT: 59 IN | SYSTOLIC BLOOD PRESSURE: 127 MMHG | WEIGHT: 293 LBS | HEART RATE: 81 BPM | DIASTOLIC BLOOD PRESSURE: 85 MMHG

## 2018-10-17 DIAGNOSIS — M25.562 PAIN IN BOTH KNEES, UNSPECIFIED CHRONICITY: ICD-10-CM

## 2018-10-17 DIAGNOSIS — Z12.11 SCREENING FOR COLORECTAL CANCER: Primary | ICD-10-CM

## 2018-10-17 DIAGNOSIS — M25.511 BILATERAL SHOULDER PAIN, UNSPECIFIED CHRONICITY: Primary | ICD-10-CM

## 2018-10-17 DIAGNOSIS — M25.512 BILATERAL SHOULDER PAIN, UNSPECIFIED CHRONICITY: Primary | ICD-10-CM

## 2018-10-17 DIAGNOSIS — Z12.12 SCREENING FOR COLORECTAL CANCER: Primary | ICD-10-CM

## 2018-10-17 DIAGNOSIS — Z12.11 COLON CANCER SCREENING: Primary | ICD-10-CM

## 2018-10-17 DIAGNOSIS — M25.561 PAIN IN BOTH KNEES, UNSPECIFIED CHRONICITY: ICD-10-CM

## 2018-10-17 PROCEDURE — 99203 OFFICE O/P NEW LOW 30 MIN: CPT | Performed by: INTERNAL MEDICINE

## 2018-10-17 PROCEDURE — 99212 OFFICE O/P EST SF 10 MIN: CPT | Performed by: INTERNAL MEDICINE

## 2018-10-17 RX ORDER — POLYETHYLENE GLYCOL 3350, SODIUM CHLORIDE, SODIUM BICARBONATE, POTASSIUM CHLORIDE 420; 11.2; 5.72; 1.48 G/4L; G/4L; G/4L; G/4L
4 POWDER, FOR SOLUTION ORAL ONCE
Qty: 4000 ML | Refills: 0 | Status: SHIPPED | OUTPATIENT
Start: 2018-10-17 | End: 2018-10-17

## 2018-10-17 NOTE — PATIENT INSTRUCTIONS
1.  Schedule screening colonoscopy following a split dose TriLyte preparation and monitored anesthesia care. 2.  Hold metformin the evening before and the morning of the procedure. 3.  May continue other medications.

## 2018-10-17 NOTE — TELEPHONE ENCOUNTER
Scheduled for:  Colonoscopy 74504  Provider Name: Dr Quynh Jacinto  Date:  Fri 01/18/19  Location:  [facility]  Sedation:  MAC  Time:  1:00 pm  Prep: split dose trilyte  Meds/Allergies Reconciled?:  PCN  Diagnosis with codes:  Colon cancer screening Z12.11  Was patient info

## 2018-10-17 NOTE — TELEPHONE ENCOUNTER
Physical Therapy is request and additional 10 visits. She has 4 left but 10 additional are needed. She is being seen for shoulder and knee pain    Referral pended.

## 2018-10-17 NOTE — PROGRESS NOTES
HPI:    Patient ID: Yung Ramirez is a 58year old female. HPI  The patient presents for a discussion regarding colorectal cancer screening. She has not had a prior colonoscopy.     The patient was briefly admitted into the hospital in July for ac mg by mouth 2 (two) times daily with meals. Disp:  Rfl:    Meclizine HCl 12.5 MG Oral Tab Take 1 tablet (12.5 mg total) by mouth 3 (three) times daily as needed.  Disp: 40 tablet Rfl: 0     Allergies:  Penicillins                 Comment:Other reaction(s): Total Bilirubin      0.3 - 1.2 mg/dL       TOTAL PROTEIN      5.9 - 8.4 g/dL       Albumin      3.5 - 4.8 g/dL  3.8 3.5    GLOBULIN, TOTAL      2.5 - 3.7 g/dL       A/G RATIO      1.0 - 2.0       ANION GAP      0 - 18 mmol/L  11 9    BUN/CREA RATIO ANION GAP      0 - 18 mmol/L 8   BUN/CREA RATIO      10.0 - 20.0 22.8 (H)   CALCULATED OSMOLALITY      275 - 295 mOsm/kg 297 (H)   GFR, Non-      >=60 27 (L)   GFR, -American      >=60 31 (L)   Patient Fasting?        Yes   WBC [id number]

## 2018-10-18 ENCOUNTER — OFFICE VISIT (OUTPATIENT)
Dept: PHYSICAL THERAPY | Age: 63
End: 2018-10-18
Attending: FAMILY MEDICINE
Payer: COMMERCIAL

## 2018-10-18 DIAGNOSIS — M25.511 BILATERAL SHOULDER PAIN, UNSPECIFIED CHRONICITY: ICD-10-CM

## 2018-10-18 DIAGNOSIS — M25.561 PAIN IN BOTH KNEES, UNSPECIFIED CHRONICITY: ICD-10-CM

## 2018-10-18 DIAGNOSIS — M25.512 BILATERAL SHOULDER PAIN, UNSPECIFIED CHRONICITY: ICD-10-CM

## 2018-10-18 DIAGNOSIS — M25.562 PAIN IN BOTH KNEES, UNSPECIFIED CHRONICITY: ICD-10-CM

## 2018-10-18 PROCEDURE — 97110 THERAPEUTIC EXERCISES: CPT

## 2018-10-18 NOTE — PROGRESS NOTES
Diagnosis: Bilateral shoulder pain, unspecified chronicity (M25.511,M25.512)  Pain in both knees, unspecified chronicity (M25.561,M25.562)        Next MD visit: none scheduled  Fall Risk: standard         Precautions: n/a          Medication Changes since

## 2018-10-19 ENCOUNTER — TELEPHONE (OUTPATIENT)
Dept: NEPHROLOGY | Facility: CLINIC | Age: 63
End: 2018-10-19

## 2018-10-19 ENCOUNTER — TELEPHONE (OUTPATIENT)
Dept: FAMILY MEDICINE CLINIC | Facility: CLINIC | Age: 63
End: 2018-10-19

## 2018-10-19 DIAGNOSIS — E66.9 DIABETES MELLITUS TYPE 2 IN OBESE (HCC): Primary | ICD-10-CM

## 2018-10-19 DIAGNOSIS — N18.30 CKD (CHRONIC KIDNEY DISEASE), STAGE III (HCC): Primary | ICD-10-CM

## 2018-10-19 DIAGNOSIS — E11.69 DIABETES MELLITUS TYPE 2 IN OBESE (HCC): Primary | ICD-10-CM

## 2018-10-19 NOTE — TELEPHONE ENCOUNTER
Pt requesting order for pneumonia vaccine      And would like to know if she needs orders for blood work per drs request. Pt does not know it if is necessary       Please advise

## 2018-10-23 ENCOUNTER — OFFICE VISIT (OUTPATIENT)
Dept: PHYSICAL THERAPY | Age: 63
End: 2018-10-23
Attending: FAMILY MEDICINE
Payer: COMMERCIAL

## 2018-10-23 DIAGNOSIS — M25.511 BILATERAL SHOULDER PAIN, UNSPECIFIED CHRONICITY: ICD-10-CM

## 2018-10-23 DIAGNOSIS — M25.512 BILATERAL SHOULDER PAIN, UNSPECIFIED CHRONICITY: ICD-10-CM

## 2018-10-23 DIAGNOSIS — M25.562 PAIN IN BOTH KNEES, UNSPECIFIED CHRONICITY: ICD-10-CM

## 2018-10-23 DIAGNOSIS — M25.561 PAIN IN BOTH KNEES, UNSPECIFIED CHRONICITY: ICD-10-CM

## 2018-10-23 PROCEDURE — 97110 THERAPEUTIC EXERCISES: CPT

## 2018-10-25 ENCOUNTER — OFFICE VISIT (OUTPATIENT)
Dept: PHYSICAL THERAPY | Age: 63
End: 2018-10-25
Attending: FAMILY MEDICINE
Payer: COMMERCIAL

## 2018-10-25 DIAGNOSIS — M25.512 BILATERAL SHOULDER PAIN, UNSPECIFIED CHRONICITY: ICD-10-CM

## 2018-10-25 DIAGNOSIS — M25.511 BILATERAL SHOULDER PAIN, UNSPECIFIED CHRONICITY: ICD-10-CM

## 2018-10-25 DIAGNOSIS — M25.562 PAIN IN BOTH KNEES, UNSPECIFIED CHRONICITY: ICD-10-CM

## 2018-10-25 DIAGNOSIS — M25.561 PAIN IN BOTH KNEES, UNSPECIFIED CHRONICITY: ICD-10-CM

## 2018-10-25 PROCEDURE — 97110 THERAPEUTIC EXERCISES: CPT

## 2018-10-25 NOTE — PROGRESS NOTES
Diagnosis: Bilateral shoulder pain, unspecified chronicity (M25.511,M25.512)  Pain in both knees, unspecified chronicity (M25.561,M25.562)        Next MD visit: none scheduled  Fall Risk: standard         Precautions: n/a          Medication Changes since min

## 2018-10-26 ENCOUNTER — LAB ENCOUNTER (OUTPATIENT)
Dept: LAB | Age: 63
End: 2018-10-26
Attending: FAMILY MEDICINE
Payer: COMMERCIAL

## 2018-10-26 DIAGNOSIS — E11.69 DIABETES MELLITUS TYPE 2 IN OBESE (HCC): ICD-10-CM

## 2018-10-26 DIAGNOSIS — E66.9 DIABETES MELLITUS TYPE 2 IN OBESE (HCC): ICD-10-CM

## 2018-10-26 PROCEDURE — 36415 COLL VENOUS BLD VENIPUNCTURE: CPT

## 2018-10-26 PROCEDURE — 80061 LIPID PANEL: CPT

## 2018-10-26 PROCEDURE — 83036 HEMOGLOBIN GLYCOSYLATED A1C: CPT

## 2018-10-26 PROCEDURE — 80076 HEPATIC FUNCTION PANEL: CPT

## 2018-10-30 ENCOUNTER — OFFICE VISIT (OUTPATIENT)
Dept: PHYSICAL THERAPY | Age: 63
End: 2018-10-30
Attending: FAMILY MEDICINE
Payer: COMMERCIAL

## 2018-10-30 DIAGNOSIS — M25.511 BILATERAL SHOULDER PAIN, UNSPECIFIED CHRONICITY: ICD-10-CM

## 2018-10-30 DIAGNOSIS — M25.562 PAIN IN BOTH KNEES, UNSPECIFIED CHRONICITY: ICD-10-CM

## 2018-10-30 DIAGNOSIS — M25.512 BILATERAL SHOULDER PAIN, UNSPECIFIED CHRONICITY: ICD-10-CM

## 2018-10-30 DIAGNOSIS — M25.561 PAIN IN BOTH KNEES, UNSPECIFIED CHRONICITY: ICD-10-CM

## 2018-10-30 PROCEDURE — 97110 THERAPEUTIC EXERCISES: CPT

## 2018-10-30 NOTE — PROGRESS NOTES
Diagnosis: Bilateral shoulder pain, unspecified chronicity (M25.511,M25.512)  Pain in both knees, unspecified chronicity (M25.561,M25.562)        Next MD visit: none scheduled  Fall Risk: standard         Precautions: n/a          Medication Changes since Activity    -    Modalities    -               Assessment: Patient reports improved ability to perform functional task of reaching and reduced overall stiffness in her knees since start of PT.     Plan: continue PT    Charges: Ex 3  Total Timed Treatment: 4

## 2018-10-31 ENCOUNTER — OFFICE VISIT (OUTPATIENT)
Dept: FAMILY MEDICINE CLINIC | Facility: CLINIC | Age: 63
End: 2018-10-31

## 2018-10-31 VITALS
WEIGHT: 293 LBS | TEMPERATURE: 98 F | DIASTOLIC BLOOD PRESSURE: 82 MMHG | SYSTOLIC BLOOD PRESSURE: 133 MMHG | HEIGHT: 59 IN | HEART RATE: 71 BPM | BODY MASS INDEX: 59.07 KG/M2 | RESPIRATION RATE: 20 BRPM

## 2018-10-31 DIAGNOSIS — E66.9 DIABETES MELLITUS TYPE 2 IN OBESE (HCC): Primary | ICD-10-CM

## 2018-10-31 DIAGNOSIS — E11.69 DIABETES MELLITUS TYPE 2 IN OBESE (HCC): Primary | ICD-10-CM

## 2018-10-31 PROCEDURE — 99213 OFFICE O/P EST LOW 20 MIN: CPT | Performed by: FAMILY MEDICINE

## 2018-10-31 PROCEDURE — 90471 IMMUNIZATION ADMIN: CPT | Performed by: FAMILY MEDICINE

## 2018-10-31 PROCEDURE — 90670 PCV13 VACCINE IM: CPT | Performed by: FAMILY MEDICINE

## 2018-10-31 PROCEDURE — 99212 OFFICE O/P EST SF 10 MIN: CPT | Performed by: FAMILY MEDICINE

## 2018-10-31 PROCEDURE — 90686 IIV4 VACC NO PRSV 0.5 ML IM: CPT | Performed by: FAMILY MEDICINE

## 2018-10-31 PROCEDURE — 90472 IMMUNIZATION ADMIN EACH ADD: CPT | Performed by: FAMILY MEDICINE

## 2018-11-01 ENCOUNTER — OFFICE VISIT (OUTPATIENT)
Dept: PHYSICAL THERAPY | Age: 63
End: 2018-11-01
Attending: FAMILY MEDICINE
Payer: COMMERCIAL

## 2018-11-01 DIAGNOSIS — M25.562 PAIN IN BOTH KNEES, UNSPECIFIED CHRONICITY: ICD-10-CM

## 2018-11-01 DIAGNOSIS — M25.561 PAIN IN BOTH KNEES, UNSPECIFIED CHRONICITY: ICD-10-CM

## 2018-11-01 DIAGNOSIS — M25.512 BILATERAL SHOULDER PAIN, UNSPECIFIED CHRONICITY: ICD-10-CM

## 2018-11-01 DIAGNOSIS — M25.511 BILATERAL SHOULDER PAIN, UNSPECIFIED CHRONICITY: ICD-10-CM

## 2018-11-01 PROCEDURE — 97110 THERAPEUTIC EXERCISES: CPT

## 2018-11-01 NOTE — PROGRESS NOTES
Diagnosis: Bilateral shoulder pain, unspecified chronicity (M25.511,M25.512)  Pain in both knees, unspecified chronicity (M25.561,M25.562)        Next MD visit: none scheduled  Fall Risk: standard         Precautions: n/a          Medication Changes since displaying improved shoulder mobility during performance of exercises today.      Plan: continue PT    Charges: Ex 4  Total Timed Treatment: 55 min  Total Treatment Time: 60 min

## 2018-11-06 ENCOUNTER — OFFICE VISIT (OUTPATIENT)
Dept: PHYSICAL THERAPY | Age: 63
End: 2018-11-06
Attending: FAMILY MEDICINE
Payer: COMMERCIAL

## 2018-11-06 DIAGNOSIS — M25.561 PAIN IN BOTH KNEES, UNSPECIFIED CHRONICITY: ICD-10-CM

## 2018-11-06 DIAGNOSIS — M25.512 BILATERAL SHOULDER PAIN, UNSPECIFIED CHRONICITY: ICD-10-CM

## 2018-11-06 DIAGNOSIS — M25.511 BILATERAL SHOULDER PAIN, UNSPECIFIED CHRONICITY: ICD-10-CM

## 2018-11-06 DIAGNOSIS — M25.562 PAIN IN BOTH KNEES, UNSPECIFIED CHRONICITY: ICD-10-CM

## 2018-11-06 PROCEDURE — 97110 THERAPEUTIC EXERCISES: CPT

## 2018-11-06 NOTE — PROGRESS NOTES
Diagnosis: Bilateral shoulder pain, unspecified chronicity (M25.511,M25.512)  Pain in both knees, unspecified chronicity (M25.561,M25.562)        Next MD visit: none scheduled  Fall Risk: standard         Precautions: n/a          Medication Changes since abd/hip ext with UE support with 1.5# at ankles 2x10  - standing B heel raises 2x10  - seated R bicep curls 2# 3x10  - seated L bicep curls 2# 2x10  - sit<>stand from mat table 23 inches B UE support 2x5 reps  - NuStep L6 (UEs and LEs) 10 minutes    Manual

## 2018-11-08 ENCOUNTER — OFFICE VISIT (OUTPATIENT)
Dept: PHYSICAL THERAPY | Age: 63
End: 2018-11-08
Attending: FAMILY MEDICINE
Payer: COMMERCIAL

## 2018-11-08 DIAGNOSIS — M25.561 PAIN IN BOTH KNEES, UNSPECIFIED CHRONICITY: ICD-10-CM

## 2018-11-08 DIAGNOSIS — M25.511 BILATERAL SHOULDER PAIN, UNSPECIFIED CHRONICITY: ICD-10-CM

## 2018-11-08 DIAGNOSIS — M25.562 PAIN IN BOTH KNEES, UNSPECIFIED CHRONICITY: ICD-10-CM

## 2018-11-08 DIAGNOSIS — M25.512 BILATERAL SHOULDER PAIN, UNSPECIFIED CHRONICITY: ICD-10-CM

## 2018-11-08 PROCEDURE — 97110 THERAPEUTIC EXERCISES: CPT | Performed by: PHYSICAL THERAPIST

## 2018-11-08 NOTE — PROGRESS NOTES
Diagnosis: Bilateral shoulder pain, unspecified chronicity (M25.511,M25.512)  Pain in both knees, unspecified chronicity (M25.561,M25.562)        Next MD visit: none scheduled  Fall Risk: standard         Precautions: n/a          Medication Changes since 1# 2x10 ea  - standing R/L hip abd/hip ext with UE support with 1.5# at ankles 2x10  - standing B heel raises with 1.5# 2x10  - seated R bicep curls 2# 3x10  - seated L bicep curls 2# 2x10  - sit<>stand from mat table 23 inches 1 UE support 2x5 reps  - NuS

## 2018-11-08 NOTE — PROGRESS NOTES
HPI:    Patient ID: Dyana Albarado is a 58year old female. Patient here for f/u diabetes. Taking medications. Doing well. Review of Systems   Constitutional: Negative. Respiratory: Negative. Negative for cough and shortness of breath. obese (HCC)  (primary encounter diagnosis)  Plan: cpm  F/u in 3 months   Will also f/u with nephrology       Orders Placed This Encounter      Flulaval 6 months and older 0.5 ml Quad PF [53763]      PNEUMOCOCCAL VACC, 13 LAUREN IM      Meds This Visit:  Reque

## 2018-11-13 ENCOUNTER — OFFICE VISIT (OUTPATIENT)
Dept: PHYSICAL THERAPY | Age: 63
End: 2018-11-13
Attending: FAMILY MEDICINE
Payer: COMMERCIAL

## 2018-11-13 PROCEDURE — 97110 THERAPEUTIC EXERCISES: CPT | Performed by: PHYSICAL THERAPIST

## 2018-11-13 NOTE — PROGRESS NOTES
Diagnosis: Bilateral shoulder pain, unspecified chronicity (M25.511,M25.512)  Pain in both knees, unspecified chronicity (M25.561,M25.562)        Next MD visit: none scheduled  Fall Risk: standard         Precautions: n/a          Medication Changes since holds  - seated R/L knee flexion with GTB 2x10 ea  - seated R/L shoulder flexion 1# 2x10 ea  - standing R/L hip abd/hip ext with UE support with 1.5# at ankles 2x10  - standing B heel raises with 1.5# 2x10  - seated R bicep curls 2# 3x10  - seated L bicep ability to reach overhead. B shoulder pain L worse than R. Also walking better due to decreased knee pain.       Plan: Continue PT      Charges: EX3                 Total Timed Treatment: 45 min  Total Treatment Time: 45 min

## 2018-11-15 ENCOUNTER — OFFICE VISIT (OUTPATIENT)
Dept: PHYSICAL THERAPY | Age: 63
End: 2018-11-15
Attending: FAMILY MEDICINE
Payer: COMMERCIAL

## 2018-11-15 PROCEDURE — 97110 THERAPEUTIC EXERCISES: CPT | Performed by: PHYSICAL THERAPIST

## 2018-11-15 NOTE — PROGRESS NOTES
Physical Therapy Treatment and Progress Report    Patient Name: Izabela Flores, MRN: [id number]  Date: 11/15/2018  Referring Physician: Dr. Torey Rock    Diagnosis: Bilateral shoulder pain, unspecified chronicity (M25.511,M25.512)  Pain in Date: 11/6/2018  Visit #: 3/8 (HMO) total 7 Date: 11/1/2018  Visit #: 2/8 (HMO) total 6   HEP       -    Therapeutic Exercise   - seated B shoulder external rotation with RTB 10 x 2 sec hold  - seated R shoulder ER with RTB 2x10  - seated L shoulder ER wit shoulder ER with RTB 2x10  - seated R/L knee extension, hip flexion 3# 2x10  - seated B shoulder flexion with wand 2x10  - seated B chest press with wand 2x10  - seated B shoulder ER with [address] 2x10  - seated R mid scap row with GTB 2x10  - seated L mid scap 3x10 ea  - seated R/L shoulder flexion 1# 2x10 ea  - standing R/L hip abd/hip ext with UE support with 1.5# at ankles 2x10  - standing B heel raises 2x10  - seated R bicep curls 2# 3x10  - seated L bicep curls 2# 2x10  - sit<>stand from mat table 23 inches

## 2018-11-20 ENCOUNTER — APPOINTMENT (OUTPATIENT)
Dept: PHYSICAL THERAPY | Age: 63
End: 2018-11-20
Attending: FAMILY MEDICINE
Payer: COMMERCIAL

## 2018-11-27 ENCOUNTER — OFFICE VISIT (OUTPATIENT)
Dept: PHYSICAL THERAPY | Age: 63
End: 2018-11-27
Attending: FAMILY MEDICINE
Payer: COMMERCIAL

## 2018-11-27 PROCEDURE — 97110 THERAPEUTIC EXERCISES: CPT | Performed by: PHYSICAL THERAPIST

## 2018-11-27 NOTE — PROGRESS NOTES
Diagnosis: Bilateral shoulder pain, unspecified chronicity (M25.511,M25.512)  Pain in both knees, unspecified chronicity (M25.561,M25.562)        Next MD visit: none scheduled  Fall Risk: standard         Precautions: n/a          Medication Changes [facility] scap row with GTB 2x10  - seated L mid scap row with GTB 2x10  - seated B scap mid rows with GTB 3x10  - seated B shoulder ext with GTB 3x10  - seated B shoulder flexion 10x  - seated R/L shoulder flexion 1# 2x10 ea  - seated B UE abduction 1# 10 x 2  - se 5 sec holds  - seated R/L knee flexion with GTB 2x10 ea  - seated R/L shoulder flexion 1# 2x10 ea  - standing R/L hip abd/hip ext with UE support with 1.5# at ankles 2x10  - standing B heel raises with 1.5# 2x10  - seated R bicep curls 2# 3x10  - seated L limited shoulder and LE mobility. Patient did well with progression of therapeutic exercises. Goals     • Therapy Goals      1. Patient will be independent with HEP, its progression, and management of residual symptoms.   2. Patient will report B shoulde

## 2018-11-27 NOTE — TELEPHONE ENCOUNTER
Reyes Gondola from [facility] department is calling to request 10 more PT visits on the existing referral for pt. Reyes Gondola states Dr review progress notes. Referral #: [id number]        Please advise thank you.

## 2018-11-28 NOTE — TELEPHONE ENCOUNTER
Dr Leslie Coleman see message below if you agree please route message to manage care for authorization.  Thank you

## 2018-11-29 ENCOUNTER — OFFICE VISIT (OUTPATIENT)
Dept: PHYSICAL THERAPY | Age: 63
End: 2018-11-29
Attending: FAMILY MEDICINE
Payer: COMMERCIAL

## 2018-11-29 PROCEDURE — 97110 THERAPEUTIC EXERCISES: CPT | Performed by: PHYSICAL THERAPIST

## 2018-11-29 NOTE — PROGRESS NOTES
Diagnosis: Bilateral shoulder pain, unspecified chronicity (M25.511,M25.512)  Pain in both knees, unspecified chronicity (M25.561,M25.562)        Next MD visit: none scheduled  Fall Risk: standard         Precautions: n/a          Medication Changes [facility] with GTB 2x10  - seated B scap mid rows with GTB 3x10  - seated B shoulder ext with GTB 3x10  - seated B shoulder flexion 10x  - seated R/L shoulder flexion 1# 2x10 ea  - seated B UE abduction 1# 10 x 2  - seated R bicep curls 3# 3x10  - seated L bicep cur shoulder ext with [address] 3x10  - seated B shoulder flexion 10x  - seated R/L LAQs with 2x10 with 3# 5 sec holds  - seated R/L shoulder flexion 1# 2x10 ea  - seated R/L knee flexion with GTB 2x10 ea  - standing R/L hip abd/hip ext with UE support with 1.5# at bicep curls 2# 3x10  - seated L bicep curls 2# 2x10  - sit<>stand from mat table 23 inches 1 UE support 2x5 reps  - NuStep L6 (UEs and LEs) 15 minutes  - seated R/L shoulder ER with YTB 2x10  - seated R/L hip flexion 3# 2x10  - seated B shoulder flexion wi

## 2018-12-04 ENCOUNTER — OFFICE VISIT (OUTPATIENT)
Dept: PHYSICAL THERAPY | Age: 63
End: 2018-12-04
Attending: FAMILY MEDICINE
Payer: COMMERCIAL

## 2018-12-04 PROCEDURE — 97110 THERAPEUTIC EXERCISES: CPT | Performed by: PHYSICAL THERAPIST

## 2018-12-04 NOTE — PROGRESS NOTES
Diagnosis: Bilateral shoulder pain, unspecified chronicity (M25.511,M25.512)  Pain in both knees, unspecified chronicity (M25.561,M25.562)        Next MD visit: none scheduled  Fall Risk: standard         Precautions: n/a          Medication Changes [facility] 2x10  - seated B scap mid rows with GTB 3x10  - seated B shoulder ext with GTB 3x10  - seated B shoulder flexion 10x  - seated R/L shoulder flexion 1# 2x10 ea  - seated B UE abduction 1# 10 x 2  - seated R bicep curls 3# 3x10  - seated L bicep curls 3# 3x1 GTB 3x10  - seated B shoulder ext with GTB 3x10  - seated B shoulder flexion 10x  - seated R/L shoulder flexion 1# 2x10 ea  - seated B UE abduction 1# 10 x 2  - seated R/L knee flexion with GTB 2x10 ea  - seated R bicep curls 3# 3x10  - seated L bicep curl 2x10 ea  - standing R/L hip abd/hip ext with UE support with 1.5# at ankles 2x10  - standing B heel raises with 1.5# 2x10  - seated R bicep curls 2# 3x10  - seated L bicep curls 2# 3x10  - sit<>stand from mat table UE support 2x5 reps  - NuStep L6 (UEs and • Therapy Goals      1. Patient will be independent with HEP, its progression, and management of residual symptoms. 2. Patient will report B shoulder and knee pain of not more than 1/10 during active motion.   3. Increase B shoulder ROM to [facility] to improve r

## 2018-12-06 ENCOUNTER — OFFICE VISIT (OUTPATIENT)
Dept: PHYSICAL THERAPY | Age: 63
End: 2018-12-06
Attending: FAMILY MEDICINE
Payer: COMMERCIAL

## 2018-12-06 PROCEDURE — 97110 THERAPEUTIC EXERCISES: CPT | Performed by: PHYSICAL THERAPIST

## 2018-12-06 NOTE — PROGRESS NOTES
Diagnosis: Bilateral shoulder pain, unspecified chronicity (M25.511,M25.512)  Pain in both knees, unspecified chronicity (M25.561,M25.562)        Next MD visit: none scheduled  Fall Risk: standard         Precautions: n/a          Medication Changes [facility] lionel 10 x 2  - seated R mid scap row with GTB 2x10  - seated L mid scap row with GTB 2x10  - seated B shoulder ext with GTB 3x10  - seated B shoulder flexion 10x  - seated R/L shoulder flexion 1# 2x10 ea  - seated B UE abduction 1# 10 x 2  - seated R bicep 3x10  - seated B shoulder ext with GTB 3x10  - seated B shoulder flexion 10x  - seated R/L shoulder flexion 1# 2x10 ea  - seated B UE abduction 1# 10 x 2  - seated R bicep curls 3# 3x10  - seated L bicep curls 3# 3x10  - seated UE diagonals 10 x 2  - seate 10x  - seated R/L LAQs with 2x10 with 3# 5 sec holds  - seated R/L shoulder flexion 1# 2x10 ea  - seated R/L knee flexion with GTB 2x10 ea  - standing R/L hip abd/hip ext with UE support with 1.5# at ankles 2x10  - standing B heel raises with 1.5# 2x10  - sit<>stand from mat table 23 inches 1 UE support 2x5 reps  - NuStep L6 (UEs and LEs) 15 minutes  - seated R/L shoulder ER with YTB 2x10  - seated R/L hip flexion 3# 2x10  - seated B shoulder flexion with wand 2x10  - seated B chest press with wand 2x10  -

## 2018-12-11 ENCOUNTER — OFFICE VISIT (OUTPATIENT)
Dept: PHYSICAL THERAPY | Age: 63
End: 2018-12-11
Attending: FAMILY MEDICINE
Payer: COMMERCIAL

## 2018-12-11 ENCOUNTER — TELEPHONE (OUTPATIENT)
Dept: GASTROENTEROLOGY | Facility: CLINIC | Age: 63
End: 2018-12-11

## 2018-12-11 DIAGNOSIS — Z12.11 SCREENING FOR COLON CANCER: Primary | ICD-10-CM

## 2018-12-11 PROCEDURE — 97110 THERAPEUTIC EXERCISES: CPT | Performed by: PHYSICAL THERAPIST

## 2018-12-11 NOTE — TELEPHONE ENCOUNTER
Spoke with pt and rescheduled surgery.     Scheduled for:  Colonoscopy 14656  Provider Name: Dr Karishma Panchal  Date: FROM 01/18/19 TO 3/22/19  Location:  [facility]  Sedation:  MAC  Time:  FROM 1:00 pm TO 145PM  Prep: split dose trilyte  Meds/Allergies Reconciled?:  PCN  Nikki

## 2018-12-11 NOTE — PROGRESS NOTES
Diagnosis: Bilateral shoulder pain, unspecified chronicity (M25.511,M25.512)  Pain in both knees, unspecified chronicity (M25.561,M25.562)        Next MD visit: none scheduled  Fall Risk: standard         Precautions: n/a          Medication Changes [facility] seated B shoulder flexion with weighted lionel 2.5lbs 10 x 2  - seated B UE abduction 1# 10 x 2  - seated R bicep curls 3# 3x10  - seated L bicep curls 3# 3x10  - seated UE diagonals with 1lb 10 x 2  - seated R/L shoulder flexion 1# 2x10 ea  - seated R/L knee 1# 10 x 2  - seated R bicep curls 3# 3x10  - seated L bicep curls 3# 3x10  - seated UE diagonals 10 x 2  - seated R/L knee flexion with GTB 2x10 ea  - seated R/L knee extension, hip flexion 3# 2x10  - standing R/L hip abd/hip ext with UE support with 3# at

## 2018-12-13 ENCOUNTER — OFFICE VISIT (OUTPATIENT)
Dept: PHYSICAL THERAPY | Age: 63
End: 2018-12-13
Attending: FAMILY MEDICINE
Payer: COMMERCIAL

## 2018-12-13 PROCEDURE — 97110 THERAPEUTIC EXERCISES: CPT | Performed by: PHYSICAL THERAPIST

## 2018-12-13 NOTE — PROGRESS NOTES
Diagnosis: Bilateral shoulder pain, unspecified chronicity (M25.511,M25.512)  Pain in both knees, unspecified chronicity (M25.561,M25.562)        Next MD visit: none scheduled  Fall Risk: standard         Precautions: n/a          Medication Changes [facility] bicep curls 3# 3x10  - seated L bicep curls 3# 3x10  - seated UE diagonals with 1lb 10 x 2  - seated R/L shoulder flexion 1# 2x10 ea  - seated R/L knee flexion with BTB 2x10 ea  - seated R/L knee extension, hip flexion 4lb 3x10  - standing R/L hip abd/hip bicep curls 3# 3x10  - seated UE diagonals 10 x 2  - seated R/L knee flexion with BTB 2x10 ea  - seated R/L knee extension, hip flexion 4lb 3x10  - standing R/L hip abd/hip ext with UE support with 4lb at ankles 3x10  - standing B heel raises with 3# 2x10 straight cane.             Plan: Continue PT      Charges: EX3                Total Timed Treatment: 45 min  Total Treatment Time: 45 min

## 2018-12-18 ENCOUNTER — OFFICE VISIT (OUTPATIENT)
Dept: PHYSICAL THERAPY | Age: 63
End: 2018-12-18
Attending: FAMILY MEDICINE
Payer: COMMERCIAL

## 2018-12-18 PROCEDURE — 97110 THERAPEUTIC EXERCISES: CPT | Performed by: PHYSICAL THERAPIST

## 2018-12-18 NOTE — PROGRESS NOTES
Diagnosis: Bilateral shoulder pain, unspecified chronicity (M25.511,M25.512)  Pain in both knees, unspecified chronicity (M25.561,M25.562)        Next MD visit: none scheduled  Fall Risk: standard         Precautions: n/a          Medication Changes [facility] 2x10 ea  - seated R/L knee extension, hip flexion 4lb 3x10  - standing R/L hip abd/hip ext with UE support with 4lb at ankles 3x10  - standing B heel raises with 4lb 2x10  - standing hip and knee flexion with 4lb 2x10  - sit<>stand from mat table 15x reps Timed Treatment: 45 min  Total Treatment Time: 45 min

## 2018-12-19 RX ORDER — FUROSEMIDE 20 MG/1
TABLET ORAL
Qty: 90 TABLET | Refills: 0 | Status: SHIPPED | OUTPATIENT
Start: 2018-12-19 | End: 2019-08-04

## 2018-12-20 ENCOUNTER — APPOINTMENT (OUTPATIENT)
Dept: PHYSICAL THERAPY | Age: 63
End: 2018-12-20
Attending: FAMILY MEDICINE
Payer: COMMERCIAL

## 2018-12-20 ENCOUNTER — OFFICE VISIT (OUTPATIENT)
Dept: PHYSICAL THERAPY | Age: 63
End: 2018-12-20
Attending: FAMILY MEDICINE
Payer: COMMERCIAL

## 2018-12-20 PROCEDURE — 97110 THERAPEUTIC EXERCISES: CPT | Performed by: PHYSICAL THERAPIST

## 2018-12-20 PROCEDURE — 97530 THERAPEUTIC ACTIVITIES: CPT | Performed by: PHYSICAL THERAPIST

## 2018-12-20 NOTE — PROGRESS NOTES
Diagnosis: Bilateral shoulder pain, unspecified chronicity (M25.511,M25.512)  Pain in both knees, unspecified chronicity (M25.561,M25.562)        Next MD visit: none scheduled  Fall Risk: standard         Precautions: n/a          Medication Changes [facility] flexion with BTB 2x10 ea  - seated R/L knee extension, hip flexion 4lb 3x10  - standing R/L hip abd/hip ext with UE support with 4lb at ankles 3x10  - standing B heel raises with 4lb 2x10  - standing hip and knee flexion with 4lb 2x10  - sit<>stand from ma

## 2018-12-31 ENCOUNTER — OFFICE VISIT (OUTPATIENT)
Dept: PHYSICAL THERAPY | Age: 63
End: 2018-12-31
Attending: FAMILY MEDICINE
Payer: COMMERCIAL

## 2018-12-31 PROCEDURE — 97110 THERAPEUTIC EXERCISES: CPT | Performed by: PHYSICAL THERAPIST

## 2018-12-31 NOTE — PROGRESS NOTES
Diagnosis: Bilateral shoulder pain, unspecified chronicity (M25.511,M25.512)  Pain in both knees, unspecified chronicity (M25.561,M25.562)        Next MD visit: none scheduled  Fall Risk: standard         Precautions: n/a          Medication Changes [facility] R/L knee extension, hip flexion 4lb 3x10  - seated L LE extension / hamstring stretch 10x  - standing R/L hip abd/hip ext with UE support with 4lb at ankles 3x10  - standing B heel raises with 4lb 2x10  - standing hip and knee flexion with 4lb 2x10  - sit< Treatment: 40 min  Total Treatment Time: 45 min

## 2019-01-07 NOTE — TELEPHONE ENCOUNTER
90 Day Supply Request On Two Medications. ...     METOPROLOL TARTRATE 25 MG TAB   QTY 90  TAKE 1/2 TABLET BY MOUTH 2 TIMES PER DAY     ATORVASTATIN 20 MG TABLET   QTY 90  TAKE 1 TABLET BY MOUTH EVERY DAY AT BEDTIME. THIS REPLACES ROSUVASTATIN 10MG

## 2019-01-08 ENCOUNTER — APPOINTMENT (OUTPATIENT)
Dept: PHYSICAL THERAPY | Age: 64
End: 2019-01-08
Attending: FAMILY MEDICINE
Payer: COMMERCIAL

## 2019-01-10 ENCOUNTER — APPOINTMENT (OUTPATIENT)
Dept: PHYSICAL THERAPY | Age: 64
End: 2019-01-10
Attending: FAMILY MEDICINE
Payer: COMMERCIAL

## 2019-01-10 ENCOUNTER — APPOINTMENT (OUTPATIENT)
Dept: LAB | Age: 64
End: 2019-01-10
Attending: INTERNAL MEDICINE
Payer: COMMERCIAL

## 2019-01-10 DIAGNOSIS — N18.30 CKD (CHRONIC KIDNEY DISEASE), STAGE III (HCC): ICD-10-CM

## 2019-01-10 LAB
ALBUMIN SERPL BCP-MCNC: 3.6 G/DL (ref 3.5–4.8)
ANION GAP SERPL CALC-SCNC: 10 MMOL/L (ref 0–18)
BUN SERPL-MCNC: 17 MG/DL (ref 8–20)
BUN/CREAT SERPL: 17.3 (ref 10–20)
CALCIUM SERPL-MCNC: 8.9 MG/DL (ref 8.5–10.5)
CHLORIDE SERPL-SCNC: 100 MMOL/L (ref 95–110)
CO2 SERPL-SCNC: 26 MMOL/L (ref 22–32)
CREAT SERPL-MCNC: 0.98 MG/DL (ref 0.5–1.5)
GLUCOSE SERPL-MCNC: 108 MG/DL (ref 70–99)
OSMOLALITY UR CALC.SUM OF ELEC: 284 MOSM/KG (ref 275–295)
PHOSPHATE SERPL-MCNC: 4.5 MG/DL (ref 2.4–4.7)
POTASSIUM SERPL-SCNC: 3.5 MMOL/L (ref 3.3–5.1)
SODIUM SERPL-SCNC: 136 MMOL/L (ref 136–144)

## 2019-01-10 PROCEDURE — 80069 RENAL FUNCTION PANEL: CPT

## 2019-01-10 PROCEDURE — 36415 COLL VENOUS BLD VENIPUNCTURE: CPT

## 2019-01-11 ENCOUNTER — OFFICE VISIT (OUTPATIENT)
Dept: PHYSICAL THERAPY | Age: 64
End: 2019-01-11
Attending: FAMILY MEDICINE
Payer: COMMERCIAL

## 2019-01-11 PROCEDURE — 97110 THERAPEUTIC EXERCISES: CPT | Performed by: PHYSICAL THERAPIST

## 2019-01-11 NOTE — PROGRESS NOTES
Diagnosis: Bilateral shoulder pain, unspecified chronicity (M25.511,M25.512)  Pain in both knees, unspecified chronicity (M25.561,M25.562)        Next MD visit: none scheduled  Fall Risk: standard         Precautions: n/a          Medication Changes [facility] st- sit<>stand from mat table 15x reps  - standing R/L hip abd/hip ext with UE support with 4lb at ankles 3x10  - standing B heel raises with 4lb 2x10  - standing hip and knee flexion with 4lb 2x10  - NuStep L7 (UEs and LEs) 20 minutes - seated forward pus 15 minutes     Therapeutic Activity     x10min  - reviewed HEP. - instructed on revised HEP. Handouts were created and issued to patient. Assessment: Patient and PT goals are in progress.  Residual L UE weakness but moving B UE's

## 2019-01-13 RX ORDER — ZOLPIDEM TARTRATE 10 MG/1
TABLET ORAL
Qty: 30 TABLET | Refills: 1 | Status: SHIPPED | OUTPATIENT
Start: 2019-01-13 | End: 2019-05-12

## 2019-01-13 NOTE — TELEPHONE ENCOUNTER
Controlled medication pending for review. If approved needs to be called in or faxed by on-site staff.     Last Rx: 9-18-18  LOV: 10-31-18

## 2019-01-15 ENCOUNTER — OFFICE VISIT (OUTPATIENT)
Dept: NEPHROLOGY | Facility: CLINIC | Age: 64
End: 2019-01-15

## 2019-01-15 VITALS
DIASTOLIC BLOOD PRESSURE: 82 MMHG | WEIGHT: 293 LBS | HEART RATE: 71 BPM | TEMPERATURE: 98 F | BODY MASS INDEX: 59.07 KG/M2 | SYSTOLIC BLOOD PRESSURE: 119 MMHG | HEIGHT: 59 IN

## 2019-01-15 DIAGNOSIS — I10 ESSENTIAL HYPERTENSION: Primary | ICD-10-CM

## 2019-01-15 PROCEDURE — 99214 OFFICE O/P EST MOD 30 MIN: CPT | Performed by: INTERNAL MEDICINE

## 2019-01-15 PROCEDURE — 99212 OFFICE O/P EST SF 10 MIN: CPT | Performed by: INTERNAL MEDICINE

## 2019-01-15 RX ORDER — LISINOPRIL 10 MG/1
10 TABLET ORAL DAILY
Qty: 90 TABLET | Refills: 0 | Status: SHIPPED | OUTPATIENT
Start: 2019-01-15 | End: 2019-04-12

## 2019-01-15 NOTE — PROGRESS NOTES
Progress Note:      Patient is a 58 yrs old female with pmh of DM II x 4 yrs, HTN, morbid obesity, HL, KHOA on CPAP who presented for follow up after hospitalization     Patient was hospitalized for PROSPER with creatinine at 4.2 mg/dl with UA +ve for hyaline atorvastatin 20 MG Oral Tab TAKE 1 TABLET BY MOUTH EVERY DAY AT BEDTIME .THIS REPLACES ROSUVASTATIN 10MG Disp: 90 tablet Rfl: 0   FUROSEMIDE 20 MG Oral Tab TAKE 1 TABLET BY MOUTH EVERY DAY Disp: 90 tablet Rfl: 0   MetFORMIN HCl  MG Oral Tablet 24 H adenopathy  Lymphatic: no abnormal cervical, supraclavicular adenopathy is noted  Respiratory:  lungs are clear to auscultation bilaterally  Cardiovascular: regular rate and rhythm   Abdomen: soft, non-tender, non-distended  Skin/Hair:  no abnormal bruisin

## 2019-01-15 NOTE — PATIENT INSTRUCTIONS
Stop metoprolol   Start on lisinopril 10 mg daily   Call in 2 weeks with home BP readings  Follow up as needed

## 2019-01-17 ENCOUNTER — OFFICE VISIT (OUTPATIENT)
Dept: PHYSICAL THERAPY | Age: 64
End: 2019-01-17
Attending: FAMILY MEDICINE
Payer: COMMERCIAL

## 2019-01-17 PROCEDURE — 97110 THERAPEUTIC EXERCISES: CPT | Performed by: PHYSICAL THERAPIST

## 2019-01-17 NOTE — PROGRESS NOTES
Diagnosis: Bilateral shoulder pain, unspecified chronicity (M25.511,M25.512)  Pain in both knees, unspecified chronicity (M25.561,M25.562)        Next MD visit: none scheduled  Fall Risk: standard         Precautions: n/a          Medication Changes [facility] raises with 4lb 2x10  - standing hip and knee flexion with 4lb 2x10  - NuStep L7 (UEs and LEs) 20 minutes - seated forward push lionel 10 x 2  - seated flexion with lionel 10 x 2  - seated R shoulder ER with RTB 2x10  - seated L shoulder ER/IR with RTB 2x10  - s

## 2019-01-22 ENCOUNTER — APPOINTMENT (OUTPATIENT)
Dept: PHYSICAL THERAPY | Age: 64
End: 2019-01-22
Attending: FAMILY MEDICINE
Payer: COMMERCIAL

## 2019-01-22 NOTE — TELEPHONE ENCOUNTER
Pt checking status on Dr. Leslie Head for CPAP replacement parts and she states will be gone for whole month of February, please advise

## 2019-01-24 ENCOUNTER — APPOINTMENT (OUTPATIENT)
Dept: PHYSICAL THERAPY | Age: 64
End: 2019-01-24
Attending: FAMILY MEDICINE
Payer: COMMERCIAL

## 2019-01-28 ENCOUNTER — TELEPHONE (OUTPATIENT)
Dept: PHYSICAL THERAPY | Age: 64
End: 2019-01-28

## 2019-01-29 ENCOUNTER — APPOINTMENT (OUTPATIENT)
Dept: PHYSICAL THERAPY | Age: 64
End: 2019-01-29
Attending: FAMILY MEDICINE
Payer: COMMERCIAL

## 2019-01-29 RX ORDER — METFORMIN HYDROCHLORIDE 500 MG/1
TABLET, EXTENDED RELEASE ORAL
Qty: 180 TABLET | Refills: 0 | Status: SHIPPED | OUTPATIENT
Start: 2019-01-29 | End: 2019-07-23

## 2019-01-31 ENCOUNTER — APPOINTMENT (OUTPATIENT)
Dept: PHYSICAL THERAPY | Age: 64
End: 2019-01-31
Attending: FAMILY MEDICINE
Payer: COMMERCIAL

## 2019-02-01 ENCOUNTER — TELEPHONE (OUTPATIENT)
Dept: NEPHROLOGY | Facility: CLINIC | Age: 64
End: 2019-02-01

## 2019-02-01 NOTE — TELEPHONE ENCOUNTER
Contacted pt and notified her of RSA's instructions below. Explained how to perform orthostatic BPs and to call office if she notices a significant drop in BP when she goes from sitting to standing (15+ points). She stated understanding.

## 2019-02-01 NOTE — TELEPHONE ENCOUNTER
Pt called w/ BP readings:    1/20/19  127/85  1/21/19  115/64  1/22/19  129/80  1/23/19  115/73  1/24/19  120/77  1/28/19  103/65  1/31/19   130/78     Pt also states that she was a little lightheaded here and there but not consistently.   Pt is leaving on

## 2019-02-01 NOTE — TELEPHONE ENCOUNTER
Looks good. Change lisinopril to evening time and see if it helps with lightheadedness.      Also to check orthostatics in future when lightheaded

## 2019-03-19 ENCOUNTER — TELEPHONE (OUTPATIENT)
Dept: GASTROENTEROLOGY | Facility: CLINIC | Age: 64
End: 2019-03-19

## 2019-03-19 NOTE — TELEPHONE ENCOUNTER
Patient is scheduled for a colonoscopy at the hospital on 03/22/19 with MAC. Patient is advised to hold Lisinopril the night before per protocol. Patient voiced understanding.

## 2019-03-19 NOTE — TELEPHONE ENCOUNTER
Pt has CLN cyndi 3/22/19, pt asking if its ok to continue new rx:Lisinopril 10 MG, pt takes in the evening. Pt has been taking for 1mth, Pls call at:[phone],thanks.

## 2019-03-22 ENCOUNTER — ANESTHESIA (OUTPATIENT)
Dept: ENDOSCOPY | Facility: HOSPITAL | Age: 64
End: 2019-03-22
Payer: COMMERCIAL

## 2019-03-22 ENCOUNTER — HOSPITAL ENCOUNTER (OUTPATIENT)
Facility: HOSPITAL | Age: 64
Setting detail: HOSPITAL OUTPATIENT SURGERY
Discharge: HOME | End: 2019-03-22
Attending: INTERNAL MEDICINE | Admitting: INTERNAL MEDICINE
Payer: COMMERCIAL

## 2019-03-22 ENCOUNTER — ANESTHESIA EVENT (OUTPATIENT)
Dept: ENDOSCOPY | Facility: HOSPITAL | Age: 64
End: 2019-03-22
Payer: COMMERCIAL

## 2019-03-22 VITALS
RESPIRATION RATE: 16 BRPM | HEART RATE: 79 BPM | DIASTOLIC BLOOD PRESSURE: 90 MMHG | HEIGHT: 59 IN | OXYGEN SATURATION: 97 % | WEIGHT: 293 LBS | SYSTOLIC BLOOD PRESSURE: 121 MMHG | BODY MASS INDEX: 59.07 KG/M2

## 2019-03-22 DIAGNOSIS — Z12.11 COLON CANCER SCREENING: ICD-10-CM

## 2019-03-22 LAB — GLUCOSE BLDC GLUCOMTR-MCNC: 95 MG/DL (ref 70–99)

## 2019-03-22 PROCEDURE — 45385 COLONOSCOPY W/LESION REMOVAL: CPT | Performed by: INTERNAL MEDICINE

## 2019-03-22 PROCEDURE — 0DBP8ZX EXCISION OF RECTUM, VIA NATURAL OR ARTIFICIAL OPENING ENDOSCOPIC, DIAGNOSTIC: ICD-10-PCS | Performed by: INTERNAL MEDICINE

## 2019-03-22 PROCEDURE — 0DBK8ZX EXCISION OF ASCENDING COLON, VIA NATURAL OR ARTIFICIAL OPENING ENDOSCOPIC, DIAGNOSTIC: ICD-10-PCS | Performed by: INTERNAL MEDICINE

## 2019-03-22 PROCEDURE — 0DBL8ZX EXCISION OF TRANSVERSE COLON, VIA NATURAL OR ARTIFICIAL OPENING ENDOSCOPIC, DIAGNOSTIC: ICD-10-PCS | Performed by: INTERNAL MEDICINE

## 2019-03-22 RX ORDER — SODIUM CHLORIDE, SODIUM LACTATE, POTASSIUM CHLORIDE, CALCIUM CHLORIDE 600; 310; 30; 20 MG/100ML; MG/100ML; MG/100ML; MG/100ML
INJECTION, SOLUTION INTRAVENOUS CONTINUOUS
Status: DISCONTINUED | OUTPATIENT
Start: 2019-03-22 | End: 2019-03-22

## 2019-03-22 RX ORDER — SODIUM CHLORIDE, SODIUM LACTATE, POTASSIUM CHLORIDE, CALCIUM CHLORIDE 600; 310; 30; 20 MG/100ML; MG/100ML; MG/100ML; MG/100ML
INJECTION, SOLUTION INTRAVENOUS CONTINUOUS
Status: CANCELLED | OUTPATIENT
Start: 2019-03-22

## 2019-03-22 RX ORDER — DEXTROSE MONOHYDRATE 25 G/50ML
50 INJECTION, SOLUTION INTRAVENOUS
Status: CANCELLED | OUTPATIENT
Start: 2019-03-22

## 2019-03-22 RX ORDER — LIDOCAINE HYDROCHLORIDE 10 MG/ML
INJECTION, SOLUTION EPIDURAL; INFILTRATION; INTRACAUDAL; PERINEURAL AS NEEDED
Status: DISCONTINUED | OUTPATIENT
Start: 2019-03-22 | End: 2019-03-22 | Stop reason: SURG

## 2019-03-22 RX ORDER — NALOXONE HYDROCHLORIDE 0.4 MG/ML
80 INJECTION, SOLUTION INTRAMUSCULAR; INTRAVENOUS; SUBCUTANEOUS AS NEEDED
Status: CANCELLED | OUTPATIENT
Start: 2019-03-22 | End: 2019-03-22

## 2019-03-22 RX ADMIN — LIDOCAINE HYDROCHLORIDE 25 MG: 10 INJECTION, SOLUTION EPIDURAL; INFILTRATION; INTRACAUDAL; PERINEURAL at 13:44:00

## 2019-03-22 NOTE — OPERATIVE REPORT
[facility] Endoscopy Report      Date of Procedure:  03/22/19      Preoperative Diagnosis:  Colorectal cancer screening      Postoperative Diagnosis:  1. Colon polyps  2. Diverticulosis left colon  3.   Internal hemorrhoids      Procedure: excised and retrieved via suction. Inspection of all the above sites revealed no evidence of ongoing bleeding. There were multiple diverticula seen in at least the left colon without signs of complication.   There were no other colonic polyps, mass lesi

## 2019-03-22 NOTE — ANESTHESIA POSTPROCEDURE EVALUATION
Patient: Kennedi Powell    Procedure Summary     Date:  03/22/19 Room / Location:  [address] [place] [address] ENDOSCOPY 05 / [address] [place] [address] ENDOSCOPY    Anesthesia Start:  4329 Anesthesia Stop:  7905    Procedure:  COLONOSCOPY (N/A ) Diagnosis:       Colon cancer screening      (colon

## 2019-03-22 NOTE — ANESTHESIA PREPROCEDURE EVALUATION
Anesthesia PreOp Note    HPI:     Maximino Blevins is a 58year old female who presents for preoperative consultation requested by: Winston Parnell MD    Date of Surgery: 3/22/2019    Procedure(s):  COLONOSCOPY  Indication: Colon cancer screening • Morbid obesity with BMI of 60.0-69.9, adult ([facility] 1/20/2016   • Obesity, unspecified    • Osteoarthritis    • Other and unspecified hyperlipidemia    • Pancreatitis 8/4/15   • Renal disorder    • Unspecified essential hypertension    • Unspecified slee Spouse name: Not on file      Number of children: 0      Years of education: Not on file      Highest education level: Not on file    Occupational History      Not on file    Social Needs      Financial resource strain: Not on file      Food insecurity: 01/10/2019     01/10/2019    CO2 26 01/10/2019    BUN 17 01/10/2019    CREATSERUM 0.98 01/10/2019     (H) 01/10/2019    PGLU 95 03/22/2019    CA 8.9 01/10/2019          Vital Signs: Body mass index is 63.42 kg/m².    height is 1.499 m (4' 11\"

## 2019-03-22 NOTE — ANESTHESIA POSTPROCEDURE EVALUATION
Patient: Sesar Blackwood    Procedure Summary     Date:  03/22/19 Room / Location:  [address] [place] [address] ENDOSCOPY 05 / [address] [place] [address] ENDOSCOPY    Anesthesia Start:  0507 Anesthesia Stop:  7855    Procedure:  COLONOSCOPY (N/A ) Diagnosis:       Colon cancer screening      (colon

## 2019-03-22 NOTE — H&P
History & Physical Examination    Patient Name: Henrietta Rashid  MRN: [id number]  [facility]: [id number]  YOB: 1955    Diagnosis: Colorectal cancer screening        Medications Prior to Admission:  METFORMIN HCL  MG Oral Tablet 24 Hr TAKE of Onset   • Cancer Father         lung cancer   • Cancer Mother         non hodgkins lymphoma   • Glaucoma Neg    • Macular degeneration Neg      Social History    Tobacco Use      Smoking status: Never Smoker      Smokeless tobacco: Never Used    Alcohol

## 2019-03-27 ENCOUNTER — TELEPHONE (OUTPATIENT)
Dept: GASTROENTEROLOGY | Facility: CLINIC | Age: 64
End: 2019-03-27

## 2019-03-27 NOTE — TELEPHONE ENCOUNTER
----- Message from Angel Fontenot MD sent at 3/26/2019  6:58 PM CDT -----  I spoke to the patient. She is feeling well. She had #5 subcentimeter tubular adenomas removed. I have discussed the significance.   I have recommended a high-fiber diet for

## 2019-04-06 RX ORDER — ATORVASTATIN CALCIUM 20 MG/1
TABLET, FILM COATED ORAL
Qty: 90 TABLET | Refills: 0 | Status: SHIPPED | OUTPATIENT
Start: 2019-04-06 | End: 2019-04-17

## 2019-04-12 RX ORDER — LISINOPRIL 10 MG/1
10 TABLET ORAL DAILY
Qty: 90 TABLET | Refills: 1 | Status: SHIPPED | OUTPATIENT
Start: 2019-04-12 | End: 2019-09-27 | Stop reason: ALTCHOICE

## 2019-04-12 NOTE — TELEPHONE ENCOUNTER
LOV 1/15/19. RTC PRN. LOV states to start on lisinopril 10 mg daily. Dr. Hilary Aly, if pt is returning only on a PRN basis, do you want to fill this medication or have pt get from PCP?

## 2019-04-16 NOTE — TELEPHONE ENCOUNTER
Contacted pt and notified her of RSA's message below. She stated understanding and agrees with this plan.

## 2019-04-17 RX ORDER — ATORVASTATIN CALCIUM 20 MG/1
TABLET, FILM COATED ORAL
Qty: 90 TABLET | Refills: 0 | Status: SHIPPED | OUTPATIENT
Start: 2019-04-17 | End: 2019-07-13

## 2019-05-13 NOTE — TELEPHONE ENCOUNTER
Review pended refill request as it does not fall under a protocol.     Last Rx: 1/13/19 #30 x1    Refill Protocol Appointment Criteria  · Appointment scheduled in the past 6 months or in the next 3 months  Recent Outpatient Visits            3 months ago

## 2019-05-14 ENCOUNTER — OFFICE VISIT (OUTPATIENT)
Dept: FAMILY MEDICINE CLINIC | Facility: CLINIC | Age: 64
End: 2019-05-14
Payer: MEDICARE

## 2019-05-14 VITALS
WEIGHT: 293 LBS | TEMPERATURE: 97 F | HEART RATE: 89 BPM | HEIGHT: 59 IN | DIASTOLIC BLOOD PRESSURE: 80 MMHG | SYSTOLIC BLOOD PRESSURE: 123 MMHG | BODY MASS INDEX: 59.07 KG/M2

## 2019-05-14 DIAGNOSIS — J06.9 UPPER RESPIRATORY INFECTION, ACUTE: Primary | ICD-10-CM

## 2019-05-14 PROCEDURE — 99212 OFFICE O/P EST SF 10 MIN: CPT | Performed by: FAMILY MEDICINE

## 2019-05-14 PROCEDURE — 99213 OFFICE O/P EST LOW 20 MIN: CPT | Performed by: FAMILY MEDICINE

## 2019-05-14 RX ORDER — AZITHROMYCIN 250 MG/1
TABLET, FILM COATED ORAL
Qty: 6 TABLET | Refills: 0 | Status: SHIPPED | OUTPATIENT
Start: 2019-05-14 | End: 2019-08-16 | Stop reason: ALTCHOICE

## 2019-05-14 RX ORDER — ZOLPIDEM TARTRATE 10 MG/1
10 TABLET ORAL NIGHTLY PRN
Qty: 90 TABLET | Refills: 0 | Status: SHIPPED | OUTPATIENT
Start: 2019-05-14 | End: 2019-09-27

## 2019-05-14 RX ORDER — BENZONATATE 200 MG/1
200 CAPSULE ORAL 3 TIMES DAILY PRN
Qty: 30 CAPSULE | Refills: 0 | Status: SHIPPED | OUTPATIENT
Start: 2019-05-14 | End: 2019-08-16 | Stop reason: ALTCHOICE

## 2019-05-14 NOTE — PROGRESS NOTES
HPI:    Patient ID: Sunita Collazo is a 71year old female. Cough for more then a week. Productive with yellowish greenish sputum. No fever. Feels slightly more tired. Not better      Review of Systems   Constitutional: Positive for fatigue.  Phuc Diez well-nourished. Cardiovascular: Normal rate, regular rhythm, normal heart sounds and intact distal pulses. Exam reveals no gallop and no friction rub. No murmur heard. Pulmonary/Chest: Effort normal and breath sounds normal. No respiratory distress.  Ayaz Barnhart

## 2019-05-16 RX ORDER — ZOLPIDEM TARTRATE 10 MG/1
TABLET ORAL
Qty: 30 TABLET | Refills: 1 | Status: SHIPPED
Start: 2019-05-16 | End: 2019-07-10

## 2019-07-08 ENCOUNTER — TELEPHONE (OUTPATIENT)
Dept: FAMILY MEDICINE CLINIC | Facility: CLINIC | Age: 64
End: 2019-07-08

## 2019-07-08 NOTE — TELEPHONE ENCOUNTER
Pt called need a referral for blood work and she states no longer seen the Kidney Doctor and did you want her to have Renal Panel test as well.

## 2019-07-12 NOTE — TELEPHONE ENCOUNTER
Controlled medication pending for review. If approved needs to be called in or faxed by on-site staff.     Last Rx: 5/16/19  LOV: 5/14/19

## 2019-07-13 RX ORDER — ZOLPIDEM TARTRATE 10 MG/1
TABLET ORAL
Qty: 30 TABLET | Refills: 1 | OUTPATIENT
Start: 2019-07-13 | End: 2019-11-08

## 2019-07-13 RX ORDER — ATORVASTATIN CALCIUM 20 MG/1
TABLET, FILM COATED ORAL
Qty: 90 TABLET | Refills: 1 | Status: SHIPPED | OUTPATIENT
Start: 2019-07-13 | End: 2020-01-08

## 2019-07-13 NOTE — TELEPHONE ENCOUNTER
Refill passed per [facility] protocol.     Cholesterol Medications  Protocol Criteria:  · Appointment scheduled in the past 12 months or in the next 3 months  · ALT & LDL on file in the past 12 months  · ALT result < 80  · LDL result <130   Recent Outp

## 2019-07-21 ENCOUNTER — APPOINTMENT (OUTPATIENT)
Dept: GENERAL RADIOLOGY | Facility: HOSPITAL | Age: 64
End: 2019-07-21
Payer: MEDICARE

## 2019-07-21 ENCOUNTER — APPOINTMENT (OUTPATIENT)
Dept: ULTRASOUND IMAGING | Facility: HOSPITAL | Age: 64
End: 2019-07-21
Attending: NURSE PRACTITIONER
Payer: MEDICARE

## 2019-07-21 ENCOUNTER — HOSPITAL ENCOUNTER (EMERGENCY)
Facility: HOSPITAL | Age: 64
Discharge: HOME | End: 2019-07-21
Payer: MEDICARE

## 2019-07-21 VITALS
TEMPERATURE: 98 F | RESPIRATION RATE: 20 BRPM | SYSTOLIC BLOOD PRESSURE: 94 MMHG | DIASTOLIC BLOOD PRESSURE: 59 MMHG | HEART RATE: 83 BPM | OXYGEN SATURATION: 98 %

## 2019-07-21 DIAGNOSIS — M25.562 ACUTE PAIN OF LEFT KNEE: Primary | ICD-10-CM

## 2019-07-21 PROCEDURE — 73560 X-RAY EXAM OF KNEE 1 OR 2: CPT

## 2019-07-21 PROCEDURE — 93971 EXTREMITY STUDY: CPT | Performed by: NURSE PRACTITIONER

## 2019-07-21 PROCEDURE — 99284 EMERGENCY DEPT VISIT MOD MDM: CPT

## 2019-07-21 RX ORDER — ACETAMINOPHEN AND CODEINE PHOSPHATE 300; 30 MG/1; MG/1
1-2 TABLET ORAL EVERY 6 HOURS PRN
Qty: 10 TABLET | Refills: 0 | Status: SHIPPED | OUTPATIENT
Start: 2019-07-21 | End: 2019-07-23

## 2019-07-21 NOTE — ED PROVIDER NOTES
Patient Seen in: [facility] AND [facility] Emergency Department    History   Patient presents with:  Lower Extremity Injury (musculoskeletal)    Stated Complaint:     HPI    71year old female, with a history of DM, HTN, high cholesterol, osteoarthritis, present Current:BP 94/59   Pulse 83   Temp 98 °F (36.7 °C)   Resp 20   SpO2 98%         Physical Exam   Constitutional: She is oriented to person, place, and time. She appears well-developed and well-nourished. HENT:   Head: Normocephalic.    Eyes: Conjunct R-0

## 2019-07-21 NOTE — ED INITIAL ASSESSMENT (HPI)
Patient here with left knee pain that began as cramping in her left calf. States she now feels as if her Tano Caban is locked up. \" Denies trauma, states she does have a long history of arthritis which she cannot take NSAIDS for

## 2019-07-22 ENCOUNTER — TELEPHONE (OUTPATIENT)
Dept: FAMILY MEDICINE CLINIC | Facility: CLINIC | Age: 64
End: 2019-07-22

## 2019-07-22 DIAGNOSIS — M25.562 ACUTE PAIN OF LEFT KNEE: Primary | ICD-10-CM

## 2019-07-22 NOTE — TELEPHONE ENCOUNTER
Patient called requesting referral for Dr Ruiz Johnson (Orthopedic Surgery) for acute L knee pain. Please sign off if agree.   Thank you,  [address]

## 2019-07-23 ENCOUNTER — TELEPHONE (OUTPATIENT)
Dept: FAMILY MEDICINE CLINIC | Facility: CLINIC | Age: 64
End: 2019-07-23

## 2019-07-23 DIAGNOSIS — E11.69 DIABETES MELLITUS TYPE 2 IN OBESE (HCC): Primary | ICD-10-CM

## 2019-07-23 DIAGNOSIS — E66.9 DIABETES MELLITUS TYPE 2 IN OBESE (HCC): Primary | ICD-10-CM

## 2019-07-23 RX ORDER — METFORMIN HYDROCHLORIDE 500 MG/1
TABLET, EXTENDED RELEASE ORAL
Qty: 180 TABLET | Refills: 1 | Status: SHIPPED | OUTPATIENT
Start: 2019-07-23 | End: 2020-04-06

## 2019-07-23 NOTE — TELEPHONE ENCOUNTER
Dr Yessy Pike, patient states she is due to see you again in October, requests lab orders,she no longer sees nephrology, do you want to add renal panel also?  Last labs Oct 2018

## 2019-07-23 NOTE — TELEPHONE ENCOUNTER
Please review; protocol failed.      Requested Prescriptions     Pending Prescriptions Disp Refills   • METFORMIN HCL  MG Oral Tablet 24 Hr [Pharmacy Med Name: METFORMIN HCL  MG TABLET] 180 tablet 0     Sig: TAKE 1 TABLET BY MOUTH TWICE A DAY [facility]

## 2019-07-23 NOTE — TELEPHONE ENCOUNTER
Controlled medication pending for review. If approved needs to be called in or faxed by on-site staff.   Dr Toni Mahmood, patient would like refill of Jena Nicoleping she was given in the ER, for knee pain, she has appt with Ortho Tuesday 7/30/19      Last Rx: 7/21/19  LO

## 2019-07-24 RX ORDER — ACETAMINOPHEN AND CODEINE PHOSPHATE 300; 30 MG/1; MG/1
1-2 TABLET ORAL EVERY 6 HOURS PRN
Qty: 30 TABLET | Refills: 0 | Status: SHIPPED | OUTPATIENT
Start: 2019-07-24 | End: 2019-07-31

## 2019-07-24 NOTE — TELEPHONE ENCOUNTER
Pt called to follow up on the medication refill requested. She stated she spoke with [facility] and they have not received it. Pt said she only has 1 pill left.   Pt said someone has to speak with the pharmacy directly before they will fill it    See previous

## 2019-07-24 NOTE — TELEPHONE ENCOUNTER
Dr Brennan Wilkinson I placed prescription on your desk, please sign so we can call and notify patient prescription is ready for .

## 2019-07-24 NOTE — TELEPHONE ENCOUNTER
Referral has been approved, patient notified via Mychart and copy of order and referral sent via [address] [place] [address] [address].

## 2019-07-24 NOTE — TELEPHONE ENCOUNTER
The patient is calling again and requesting just Tylenol #3 Pended until seen by Ortho on 7/30/19. She only has a couple left. The knee pain when locks up is a 10/10; When just sitting it is a 8/10 with the tylenol #3 it goes to a 6-7/10.     SCOTTY murrieta

## 2019-07-24 NOTE — TELEPHONE ENCOUNTER
Clinical site staff please f/u on this prescription and notify patient on when and where to , thank you.  Please respond to pool: HUMAIRA [facility] & half-way LPN/CMA

## 2019-08-05 RX ORDER — FUROSEMIDE 20 MG/1
TABLET ORAL
Qty: 90 TABLET | Refills: 0 | Status: SHIPPED | OUTPATIENT
Start: 2019-08-05 | End: 2019-10-30

## 2019-08-05 NOTE — TELEPHONE ENCOUNTER
Pt advised of lab orders, stated her knee went out, able to only walk a few steps, saw ortho, will when knee is better for walking

## 2019-08-05 NOTE — TELEPHONE ENCOUNTER
RN please contact pt and remind to complete the labs ordered on 7/23/19 by [facility]. Then route request for [facility]SB's review. [employer] message also sent.      Requested Prescriptions     Pending Prescriptions Disp Refills   • FUROSEMIDE 20 MG Oral Tab [Pharmacy Med N

## 2019-08-09 NOTE — TELEPHONE ENCOUNTER
Arturo[facility] calling to inform the office that he would be FAXING over forms to be completed for pt to receive  Script/Order for CPAP Machine Supplies. He is asking to have all sections filled out before sending/faxing the FORM(s) back to them.

## 2019-08-12 ENCOUNTER — LAB ENCOUNTER (OUTPATIENT)
Dept: LAB | Age: 64
End: 2019-08-12
Attending: FAMILY MEDICINE
Payer: MEDICARE

## 2019-08-12 DIAGNOSIS — E66.9 DIABETES MELLITUS TYPE 2 IN OBESE (HCC): ICD-10-CM

## 2019-08-12 DIAGNOSIS — E11.69 DIABETES MELLITUS TYPE 2 IN OBESE (HCC): ICD-10-CM

## 2019-08-12 LAB
ALBUMIN SERPL-MCNC: 3.4 G/DL (ref 3.4–5)
ALBUMIN/GLOB SERPL: 0.8 {RATIO} (ref 1–2)
ALP LIVER SERPL-CCNC: 85 U/L (ref 50–130)
ALT SERPL-CCNC: 16 U/L (ref 13–56)
ANION GAP SERPL CALC-SCNC: 9 MMOL/L (ref 0–18)
AST SERPL-CCNC: 11 U/L (ref 15–37)
BASOPHILS # BLD AUTO: 0.07 X10(3) UL (ref 0–0.2)
BASOPHILS NFR BLD AUTO: 0.7 %
BILIRUB SERPL-MCNC: 0.4 MG/DL (ref 0.1–2)
BUN BLD-MCNC: 74 MG/DL (ref 7–18)
BUN/CREAT SERPL: 30 (ref 10–20)
CALCIUM BLD-MCNC: 9.3 MG/DL (ref 8.5–10.1)
CHLORIDE SERPL-SCNC: 102 MMOL/L (ref 98–112)
CHOLEST SMN-MCNC: 137 MG/DL (ref ?–200)
CO2 SERPL-SCNC: 23 MMOL/L (ref 21–32)
CREAT BLD-MCNC: 2.47 MG/DL (ref 0.55–1.02)
CREAT UR-SCNC: 156 MG/DL
DEPRECATED RDW RBC AUTO: 45.3 FL (ref 35.1–46.3)
EOSINOPHIL # BLD AUTO: 0.1 X10(3) UL (ref 0–0.7)
EOSINOPHIL NFR BLD AUTO: 1 %
ERYTHROCYTE [DISTWIDTH] IN BLOOD BY AUTOMATED COUNT: 13.4 % (ref 11–15)
EST. AVERAGE GLUCOSE BLD GHB EST-MCNC: 134 MG/DL (ref 68–126)
GLOBULIN PLAS-MCNC: 4.2 G/DL (ref 2.8–4.4)
GLUCOSE BLD-MCNC: 116 MG/DL (ref 70–99)
HBA1C MFR BLD HPLC: 6.3 % (ref ?–5.7)
HCT VFR BLD AUTO: 40.4 % (ref 35–48)
HDLC SERPL-MCNC: 43 MG/DL (ref 40–59)
HGB BLD-MCNC: 12.9 G/DL (ref 12–16)
IMM GRANULOCYTES # BLD AUTO: 0.02 X10(3) UL (ref 0–1)
IMM GRANULOCYTES NFR BLD: 0.2 %
LDLC SERPL CALC-MCNC: 72 MG/DL (ref ?–100)
LYMPHOCYTES # BLD AUTO: 3.3 X10(3) UL (ref 1–4)
LYMPHOCYTES NFR BLD AUTO: 34.4 %
M PROTEIN MFR SERPL ELPH: 7.6 G/DL (ref 6.4–8.2)
MCH RBC QN AUTO: 29.4 PG (ref 26–34)
MCHC RBC AUTO-ENTMCNC: 31.9 G/DL (ref 31–37)
MCV RBC AUTO: 92 FL (ref 80–100)
MICROALBUMIN UR-MCNC: 1.85 MG/DL
MICROALBUMIN/CREAT 24H UR-RTO: 11.9 UG/MG (ref ?–30)
MONOCYTES # BLD AUTO: 0.74 X10(3) UL (ref 0.1–1)
MONOCYTES NFR BLD AUTO: 7.7 %
NEUTROPHILS # BLD AUTO: 5.37 X10 (3) UL (ref 1.5–7.7)
NEUTROPHILS # BLD AUTO: 5.37 X10(3) UL (ref 1.5–7.7)
NEUTROPHILS NFR BLD AUTO: 56 %
NONHDLC SERPL-MCNC: 94 MG/DL (ref ?–130)
OSMOLALITY SERPL CALC.SUM OF ELEC: 301 MOSM/KG (ref 275–295)
PATIENT FASTING: YES
PATIENT FASTING: YES
PLATELET # BLD AUTO: 279 10(3)UL (ref 150–450)
POTASSIUM SERPL-SCNC: 4.7 MMOL/L (ref 3.5–5.1)
RBC # BLD AUTO: 4.39 X10(6)UL (ref 3.8–5.3)
SODIUM SERPL-SCNC: 134 MMOL/L (ref 136–145)
TRIGL SERPL-MCNC: 109 MG/DL (ref 30–149)
TSI SER-ACNC: 2.71 MIU/ML (ref 0.36–3.74)
VLDLC SERPL CALC-MCNC: 22 MG/DL (ref 0–30)
WBC # BLD AUTO: 9.6 X10(3) UL (ref 4–11)

## 2019-08-12 PROCEDURE — 85025 COMPLETE CBC W/AUTO DIFF WBC: CPT

## 2019-08-12 PROCEDURE — 83036 HEMOGLOBIN GLYCOSYLATED A1C: CPT

## 2019-08-12 PROCEDURE — 82570 ASSAY OF URINE CREATININE: CPT

## 2019-08-12 PROCEDURE — 80053 COMPREHEN METABOLIC PANEL: CPT

## 2019-08-12 PROCEDURE — 82043 UR ALBUMIN QUANTITATIVE: CPT

## 2019-08-12 PROCEDURE — 80061 LIPID PANEL: CPT

## 2019-08-12 PROCEDURE — 84443 ASSAY THYROID STIM HORMONE: CPT

## 2019-08-12 PROCEDURE — 36415 COLL VENOUS BLD VENIPUNCTURE: CPT

## 2019-08-16 ENCOUNTER — APPOINTMENT (OUTPATIENT)
Dept: LAB | Age: 64
End: 2019-08-16
Attending: FAMILY MEDICINE
Payer: MEDICARE

## 2019-08-16 ENCOUNTER — OFFICE VISIT (OUTPATIENT)
Dept: FAMILY MEDICINE CLINIC | Facility: CLINIC | Age: 64
End: 2019-08-16
Payer: MEDICARE

## 2019-08-16 ENCOUNTER — LAB ENCOUNTER (OUTPATIENT)
Dept: LAB | Age: 64
End: 2019-08-16
Attending: FAMILY MEDICINE
Payer: MEDICARE

## 2019-08-16 VITALS
HEIGHT: 59 IN | HEART RATE: 92 BPM | DIASTOLIC BLOOD PRESSURE: 68 MMHG | BODY MASS INDEX: 59.07 KG/M2 | SYSTOLIC BLOOD PRESSURE: 94 MMHG | TEMPERATURE: 98 F | WEIGHT: 293 LBS

## 2019-08-16 DIAGNOSIS — N28.9 KIDNEY INSUFFICIENCY: Primary | ICD-10-CM

## 2019-08-16 DIAGNOSIS — N28.9 KIDNEY INSUFFICIENCY: ICD-10-CM

## 2019-08-16 DIAGNOSIS — N28.9 KIDNEY DISORDER: Primary | ICD-10-CM

## 2019-08-16 LAB
ANION GAP SERPL CALC-SCNC: 9 MMOL/L (ref 0–18)
BUN BLD-MCNC: 60 MG/DL (ref 7–18)
BUN/CREAT SERPL: 29 (ref 10–20)
CALCIUM BLD-MCNC: 9.4 MG/DL (ref 8.5–10.1)
CHLORIDE SERPL-SCNC: 101 MMOL/L (ref 98–112)
CO2 SERPL-SCNC: 25 MMOL/L (ref 21–32)
CREAT BLD-MCNC: 2.07 MG/DL (ref 0.55–1.02)
GLUCOSE BLD-MCNC: 125 MG/DL (ref 70–99)
OSMOLALITY SERPL CALC.SUM OF ELEC: 298 MOSM/KG (ref 275–295)
PATIENT FASTING: NO
POTASSIUM SERPL-SCNC: 4.5 MMOL/L (ref 3.5–5.1)
RBC #/AREA URNS AUTO: 2 /HPF
SODIUM SERPL-SCNC: 135 MMOL/L (ref 136–145)
WBC #/AREA URNS AUTO: 8 /HPF

## 2019-08-16 PROCEDURE — 99214 OFFICE O/P EST MOD 30 MIN: CPT | Performed by: FAMILY MEDICINE

## 2019-08-16 PROCEDURE — 93005 ELECTROCARDIOGRAM TRACING: CPT

## 2019-08-16 PROCEDURE — 80048 BASIC METABOLIC PNL TOTAL CA: CPT

## 2019-08-16 PROCEDURE — G0463 HOSPITAL OUTPT CLINIC VISIT: HCPCS | Performed by: FAMILY MEDICINE

## 2019-08-16 PROCEDURE — 36415 COLL VENOUS BLD VENIPUNCTURE: CPT

## 2019-08-16 PROCEDURE — 81015 MICROSCOPIC EXAM OF URINE: CPT

## 2019-08-16 PROCEDURE — 93010 ELECTROCARDIOGRAM REPORT: CPT | Performed by: FAMILY MEDICINE

## 2019-08-19 ENCOUNTER — APPOINTMENT (OUTPATIENT)
Dept: LAB | Age: 64
End: 2019-08-19
Attending: INTERNAL MEDICINE
Payer: MEDICARE

## 2019-08-19 ENCOUNTER — OFFICE VISIT (OUTPATIENT)
Dept: NEPHROLOGY | Facility: CLINIC | Age: 64
End: 2019-08-19
Payer: MEDICARE

## 2019-08-19 VITALS
BODY MASS INDEX: 63 KG/M2 | DIASTOLIC BLOOD PRESSURE: 74 MMHG | HEART RATE: 94 BPM | HEIGHT: 59 IN | SYSTOLIC BLOOD PRESSURE: 105 MMHG

## 2019-08-19 DIAGNOSIS — N28.9 KIDNEY INSUFFICIENCY: ICD-10-CM

## 2019-08-19 DIAGNOSIS — N17.9 AKI (ACUTE KIDNEY INJURY) (HCC): ICD-10-CM

## 2019-08-19 DIAGNOSIS — R60.9 EDEMA, UNSPECIFIED TYPE: Primary | ICD-10-CM

## 2019-08-19 LAB
ALBUMIN SERPL-MCNC: 3.6 G/DL (ref 3.4–5)
ANION GAP SERPL CALC-SCNC: 7 MMOL/L (ref 0–18)
BUN BLD-MCNC: 34 MG/DL (ref 7–18)
BUN/CREAT SERPL: 24.1 (ref 10–20)
CALCIUM BLD-MCNC: 9.6 MG/DL (ref 8.5–10.1)
CHLORIDE SERPL-SCNC: 102 MMOL/L (ref 98–112)
CO2 SERPL-SCNC: 26 MMOL/L (ref 21–32)
CREAT BLD-MCNC: 1.41 MG/DL (ref 0.55–1.02)
GLUCOSE BLD-MCNC: 101 MG/DL (ref 70–99)
OSMOLALITY SERPL CALC.SUM OF ELEC: 288 MOSM/KG (ref 275–295)
PHOSPHATE SERPL-MCNC: 3.1 MG/DL (ref 2.5–4.9)
POTASSIUM SERPL-SCNC: 4.6 MMOL/L (ref 3.5–5.1)
SODIUM SERPL-SCNC: 135 MMOL/L (ref 136–145)

## 2019-08-19 PROCEDURE — G0463 HOSPITAL OUTPT CLINIC VISIT: HCPCS | Performed by: INTERNAL MEDICINE

## 2019-08-19 PROCEDURE — 99215 OFFICE O/P EST HI 40 MIN: CPT | Performed by: INTERNAL MEDICINE

## 2019-08-19 PROCEDURE — 80069 RENAL FUNCTION PANEL: CPT

## 2019-08-19 PROCEDURE — 36415 COLL VENOUS BLD VENIPUNCTURE: CPT

## 2019-08-19 NOTE — PROGRESS NOTES
08/19/19        Patient: Luh Panda   YOB: 1955   Date of Visit: 8/19/2019       Dear  Dr. Aly Perry MD,      Thank you for referring Luh Panda to my practice. Please find my assessment and plan below.       As you know she that she has been feeling more tired than usual.  She states she is now feeling better since lisinopril and furosemide have been placed on hold. She was not checking her blood pressures but started to do so over the weekend.   Her blood pressure at home th Dinh Elmore MD   [facility] , [address] [place] [address] Street, [facility]  [address]  [facility] [address]    Document electronically generated by:  Dinh Elmore

## 2019-08-19 NOTE — PATIENT INSTRUCTIONS
Repeat your kidney blood test now. Continue to check your blood pressures and weights daily and record. Do the echocardiogram as ordered.

## 2019-08-20 ENCOUNTER — TELEPHONE (OUTPATIENT)
Dept: FAMILY MEDICINE CLINIC | Facility: CLINIC | Age: 64
End: 2019-08-20

## 2019-08-20 ENCOUNTER — TELEPHONE (OUTPATIENT)
Dept: NEPHROLOGY | Facility: CLINIC | Age: 64
End: 2019-08-20

## 2019-08-20 DIAGNOSIS — N17.9 ACUTE KIDNEY INJURY (HCC): Primary | ICD-10-CM

## 2019-08-20 DIAGNOSIS — N17.9 AKI (ACUTE KIDNEY INJURY) (HCC): Primary | ICD-10-CM

## 2019-08-20 DIAGNOSIS — I10 ESSENTIAL HYPERTENSION: ICD-10-CM

## 2019-08-20 NOTE — TELEPHONE ENCOUNTER
Spoke to pt, relayed Dr. Janiya Hope message as shown below. Pt aware to have repeat renal panel done. Order entered. Pt had no further questions at this time. Pt states her weight has not changed since yesterdays OV.  States BP this morning was 113/73, un

## 2019-08-20 NOTE — TELEPHONE ENCOUNTER
Spoke with patient (name and  verified), reviewed information, patient verbalized understanding and agrees with plan.   Given contact info to make appt for echo

## 2019-08-20 NOTE — TELEPHONE ENCOUNTER
Consulted with Dr. Pattie Courtney. It is okay to stop the potassium supplement for now. It seems like Dr. Thee Redd has ordered the Echo yesterday already. She can get it done with his order.

## 2019-08-20 NOTE — TELEPHONE ENCOUNTER
Kidney function continues to improve. Repeat a renal panel in 1 week. However blood pressures and weights? ?

## 2019-08-20 NOTE — TELEPHONE ENCOUNTER
Patient stating that she was seen by [facility] on 8/16 who consulted with nephrology and advised patient to d/c Lasix and lisinopril. Asking if she should d/c the potassium supplement as well. She has not taken potassium for the last three days.  Also mentioning,

## 2019-08-26 NOTE — PROGRESS NOTES
HPI:    Patient ID: Sharon Todd is a 71year old female. Patient feeling slightly dizzy hypotensive and blood test showing kidney insuficiency again. Review of Systems   Constitutional: Positive for fatigue.  Negative for activity change, a distress. She has no wheezes. She has no rales. She exhibits no tenderness. ASSESSMENT/PLAN:   Kidney insufficiency  (primary encounter diagnosis)  Stop lasix and other blood pressure medication.  See dr Gurvinder Méndez ( arranged) in few days  If wors

## 2019-08-28 ENCOUNTER — APPOINTMENT (OUTPATIENT)
Dept: LAB | Age: 64
End: 2019-08-28
Attending: INTERNAL MEDICINE
Payer: MEDICARE

## 2019-08-28 DIAGNOSIS — N17.9 AKI (ACUTE KIDNEY INJURY) (HCC): ICD-10-CM

## 2019-08-28 LAB
ALBUMIN SERPL-MCNC: 3.4 G/DL (ref 3.4–5)
ANION GAP SERPL CALC-SCNC: 6 MMOL/L (ref 0–18)
BUN BLD-MCNC: 24 MG/DL (ref 7–18)
BUN/CREAT SERPL: 18.2 (ref 10–20)
CALCIUM BLD-MCNC: 9 MG/DL (ref 8.5–10.1)
CHLORIDE SERPL-SCNC: 107 MMOL/L (ref 98–112)
CO2 SERPL-SCNC: 26 MMOL/L (ref 21–32)
CREAT BLD-MCNC: 1.32 MG/DL (ref 0.55–1.02)
GLUCOSE BLD-MCNC: 113 MG/DL (ref 70–99)
OSMOLALITY SERPL CALC.SUM OF ELEC: 293 MOSM/KG (ref 275–295)
PHOSPHATE SERPL-MCNC: 3.8 MG/DL (ref 2.5–4.9)
POTASSIUM SERPL-SCNC: 4.4 MMOL/L (ref 3.5–5.1)
SODIUM SERPL-SCNC: 139 MMOL/L (ref 136–145)

## 2019-08-28 PROCEDURE — 36415 COLL VENOUS BLD VENIPUNCTURE: CPT

## 2019-08-28 PROCEDURE — 80069 RENAL FUNCTION PANEL: CPT

## 2019-08-29 RX ORDER — POTASSIUM CHLORIDE 1.5 G/1.77G
20 POWDER, FOR SOLUTION ORAL DAILY
Qty: 90 PACKET | Refills: 0 | Status: SHIPPED | OUTPATIENT
Start: 2019-08-29 | End: 2019-09-27 | Stop reason: ALTCHOICE

## 2019-09-01 ENCOUNTER — TELEPHONE (OUTPATIENT)
Dept: NEPHROLOGY | Facility: CLINIC | Age: 64
End: 2019-09-01

## 2019-09-01 DIAGNOSIS — N17.9 AKI (ACUTE KIDNEY INJURY) (HCC): Primary | ICD-10-CM

## 2019-09-01 NOTE — TELEPHONE ENCOUNTER
Kidney function continues to improve. Still not quite back to baseline. Any problems with blood pressures or edema.   If doing well repeat a renal panel in 3 weeks

## 2019-09-03 NOTE — TELEPHONE ENCOUNTER
Dr. Myra Campos- May close encounter if appropriate. Pt returned call, states yesterday (9/2/19) noted some swelling, but swelling went down during the night and today's weight was back to normal. Denies any issues with BP at this time.  Pt verbalized

## 2019-09-06 ENCOUNTER — TELEPHONE (OUTPATIENT)
Dept: NEPHROLOGY | Facility: CLINIC | Age: 64
End: 2019-09-06

## 2019-09-06 ENCOUNTER — HOSPITAL ENCOUNTER (OUTPATIENT)
Dept: CV DIAGNOSTICS | Facility: HOSPITAL | Age: 64
Discharge: HOME | End: 2019-09-06
Attending: INTERNAL MEDICINE
Payer: MEDICARE

## 2019-09-06 DIAGNOSIS — N17.9 AKI (ACUTE KIDNEY INJURY) (HCC): ICD-10-CM

## 2019-09-06 DIAGNOSIS — R60.9 EDEMA, UNSPECIFIED TYPE: Primary | ICD-10-CM

## 2019-09-06 DIAGNOSIS — R60.9 EDEMA, UNSPECIFIED TYPE: ICD-10-CM

## 2019-09-06 PROCEDURE — 93306 TTE W/DOPPLER COMPLETE: CPT | Performed by: INTERNAL MEDICINE

## 2019-09-06 NOTE — TELEPHONE ENCOUNTER
Patient contacted. Edema in both legs from ankles to knees with pitting. She has gained 5 lbs over the last few days. Blood pressure ok (B/P) meds and Lasix were stopped on 8/16/19. SOB with exertion but feels ok today. Routed to Dr. Teodoro Herrera to advise.

## 2019-09-06 NOTE — TELEPHONE ENCOUNTER
Patient contacted. Aware to do lab in 1 week (order entered in [address]) Will resume Furosemide as Dr. Iftikhar Abbott advised and will call back to schedule a follow up visit.

## 2019-09-06 NOTE — TELEPHONE ENCOUNTER
Resume furosemide 20 mg 1 every morning. Repeat a CBC and renal panel in 1 week and then see for follow-up.

## 2019-09-09 ENCOUNTER — TELEPHONE (OUTPATIENT)
Dept: NEPHROLOGY | Facility: CLINIC | Age: 64
End: 2019-09-09

## 2019-09-09 NOTE — TELEPHONE ENCOUNTER
For now just increase potassium in diet. She will be doing labs in 1 week and will see what potassium is at that time.

## 2019-09-16 ENCOUNTER — LAB ENCOUNTER (OUTPATIENT)
Dept: LAB | Age: 64
End: 2019-09-16
Attending: INTERNAL MEDICINE
Payer: MEDICARE

## 2019-09-16 DIAGNOSIS — N17.9 AKI (ACUTE KIDNEY INJURY) (HCC): ICD-10-CM

## 2019-09-16 DIAGNOSIS — R60.9 EDEMA, UNSPECIFIED TYPE: ICD-10-CM

## 2019-09-16 LAB
ALBUMIN SERPL-MCNC: 3.7 G/DL (ref 3.4–5)
ANION GAP SERPL CALC-SCNC: 5 MMOL/L (ref 0–18)
BASOPHILS # BLD AUTO: 0.06 X10(3) UL (ref 0–0.2)
BASOPHILS NFR BLD AUTO: 0.8 %
BUN BLD-MCNC: 17 MG/DL (ref 7–18)
BUN/CREAT SERPL: 16.3 (ref 10–20)
CALCIUM BLD-MCNC: 9.6 MG/DL (ref 8.5–10.1)
CHLORIDE SERPL-SCNC: 107 MMOL/L (ref 98–112)
CO2 SERPL-SCNC: 30 MMOL/L (ref 21–32)
CREAT BLD-MCNC: 1.04 MG/DL (ref 0.55–1.02)
DEPRECATED RDW RBC AUTO: 48.6 FL (ref 35.1–46.3)
EOSINOPHIL # BLD AUTO: 0.16 X10(3) UL (ref 0–0.7)
EOSINOPHIL NFR BLD AUTO: 2.2 %
ERYTHROCYTE [DISTWIDTH] IN BLOOD BY AUTOMATED COUNT: 14.2 % (ref 11–15)
GLUCOSE BLD-MCNC: 103 MG/DL (ref 70–99)
HCT VFR BLD AUTO: 42.6 % (ref 35–48)
HGB BLD-MCNC: 13.4 G/DL (ref 12–16)
IMM GRANULOCYTES # BLD AUTO: 0.01 X10(3) UL (ref 0–1)
IMM GRANULOCYTES NFR BLD: 0.1 %
LYMPHOCYTES # BLD AUTO: 2.08 X10(3) UL (ref 1–4)
LYMPHOCYTES NFR BLD AUTO: 28.4 %
MCH RBC QN AUTO: 29.6 PG (ref 26–34)
MCHC RBC AUTO-ENTMCNC: 31.5 G/DL (ref 31–37)
MCV RBC AUTO: 94.2 FL (ref 80–100)
MONOCYTES # BLD AUTO: 0.64 X10(3) UL (ref 0.1–1)
MONOCYTES NFR BLD AUTO: 8.7 %
NEUTROPHILS # BLD AUTO: 4.37 X10 (3) UL (ref 1.5–7.7)
NEUTROPHILS # BLD AUTO: 4.37 X10(3) UL (ref 1.5–7.7)
NEUTROPHILS NFR BLD AUTO: 59.8 %
OSMOLALITY SERPL CALC.SUM OF ELEC: 296 MOSM/KG (ref 275–295)
PHOSPHATE SERPL-MCNC: 4 MG/DL (ref 2.5–4.9)
PLATELET # BLD AUTO: 284 10(3)UL (ref 150–450)
POTASSIUM SERPL-SCNC: 4.1 MMOL/L (ref 3.5–5.1)
RBC # BLD AUTO: 4.52 X10(6)UL (ref 3.8–5.3)
SODIUM SERPL-SCNC: 142 MMOL/L (ref 136–145)
WBC # BLD AUTO: 7.3 X10(3) UL (ref 4–11)

## 2019-09-16 PROCEDURE — 36415 COLL VENOUS BLD VENIPUNCTURE: CPT

## 2019-09-16 PROCEDURE — 85025 COMPLETE CBC W/AUTO DIFF WBC: CPT

## 2019-09-16 PROCEDURE — 80069 RENAL FUNCTION PANEL: CPT

## 2019-09-27 ENCOUNTER — OFFICE VISIT (OUTPATIENT)
Dept: NEPHROLOGY | Facility: CLINIC | Age: 64
End: 2019-09-27
Payer: MEDICARE

## 2019-09-27 VITALS
DIASTOLIC BLOOD PRESSURE: 85 MMHG | WEIGHT: 293 LBS | HEIGHT: 59 IN | SYSTOLIC BLOOD PRESSURE: 136 MMHG | BODY MASS INDEX: 59.07 KG/M2 | HEART RATE: 74 BPM

## 2019-09-27 DIAGNOSIS — N18.30 CKD (CHRONIC KIDNEY DISEASE), STAGE III (HCC): Primary | ICD-10-CM

## 2019-09-27 DIAGNOSIS — R60.9 EDEMA, UNSPECIFIED TYPE: ICD-10-CM

## 2019-09-27 PROCEDURE — G0463 HOSPITAL OUTPT CLINIC VISIT: HCPCS | Performed by: INTERNAL MEDICINE

## 2019-09-27 PROCEDURE — 99214 OFFICE O/P EST MOD 30 MIN: CPT | Performed by: INTERNAL MEDICINE

## 2019-09-27 NOTE — PATIENT INSTRUCTIONS
Continue to monitor your blood pressures. If it should increase significantly please let us know. Otherwise repeat your kidney blood test in mid December 2019.

## 2019-09-27 NOTE — PROGRESS NOTES
09/27/19        Patient: Emeka Haynes   YOB: 1955   Date of Visit: 9/27/2019       Dear  Dr. Monica Davis MD,      Thank you for referring Emeka Haynes to my practice. Please find my assessment and plan below.       As you know she blood pressures and if it should increase significantly she will let me know. Otherwise repeat a renal panel in 3 months. She is hoping to go to [place] in January or February. If stable see no contraindication.   Otherwise she knows to avoid any use of

## 2019-10-08 ENCOUNTER — TELEPHONE (OUTPATIENT)
Dept: FAMILY MEDICINE CLINIC | Facility: CLINIC | Age: 64
End: 2019-10-08

## 2019-10-08 DIAGNOSIS — E11.8 CONTROLLED DIABETES MELLITUS TYPE 2 WITH COMPLICATIONS, UNSPECIFIED WHETHER LONG TERM INSULIN USE (HCC): Primary | ICD-10-CM

## 2019-10-09 NOTE — TELEPHONE ENCOUNTER
Refill passed per [facility], [facility] protocol.   Diabetes Medications  Protocol Criteria:  · Appointment scheduled in the past 6 months or the next 3 months  · A1C < 7.5 in the past 6 months  · Creatinine in the past 12 months  · Creatinine result < 1.5   Rece
Breath sounds clear and equal bilaterally.

## 2019-10-23 ENCOUNTER — NURSE ONLY (OUTPATIENT)
Dept: FAMILY MEDICINE CLINIC | Facility: CLINIC | Age: 64
End: 2019-10-23
Payer: MEDICARE

## 2019-10-23 ENCOUNTER — IMMUNIZATION (OUTPATIENT)
Dept: FAMILY MEDICINE CLINIC | Facility: CLINIC | Age: 64
End: 2019-10-23
Payer: MEDICARE

## 2019-10-23 DIAGNOSIS — Z23 NEED FOR INFLUENZA VACCINATION: Primary | ICD-10-CM

## 2019-10-23 DIAGNOSIS — Z23 NEED FOR VACCINATION: ICD-10-CM

## 2019-10-23 PROCEDURE — G0009 ADMIN PNEUMOCOCCAL VACCINE: HCPCS | Performed by: FAMILY MEDICINE

## 2019-10-23 PROCEDURE — G0008 ADMIN INFLUENZA VIRUS VAC: HCPCS | Performed by: FAMILY MEDICINE

## 2019-10-23 PROCEDURE — 90662 IIV NO PRSV INCREASED AG IM: CPT | Performed by: FAMILY MEDICINE

## 2019-10-23 PROCEDURE — 90732 PPSV23 VACC 2 YRS+ SUBQ/IM: CPT | Performed by: FAMILY MEDICINE

## 2019-10-24 ENCOUNTER — TELEPHONE (OUTPATIENT)
Dept: CASE MANAGEMENT | Age: 64
End: 2019-10-24

## 2019-10-31 RX ORDER — FUROSEMIDE 20 MG/1
TABLET ORAL
Qty: 90 TABLET | Refills: 1 | Status: SHIPPED | OUTPATIENT
Start: 2019-10-31 | End: 2020-04-06

## 2019-11-01 NOTE — TELEPHONE ENCOUNTER
Refill passed per [facility], [facility] protocol.   Hypertensive Medications  Protocol Criteria:  · Appointment scheduled in the past 6 months or in the next 3 months  · BMP or CMP in the past 12 months  · Creatinine result < 2  Recent Outpatient Visits

## 2019-11-08 RX ORDER — ZOLPIDEM TARTRATE 10 MG/1
TABLET ORAL
Qty: 30 TABLET | Refills: 1 | Status: SHIPPED | OUTPATIENT
Start: 2019-11-08 | End: 2020-03-02

## 2019-11-08 NOTE — TELEPHONE ENCOUNTER
Controlled medication pending for review. Please change to phone in, fax, or print script if not being sent electronically.     Last Rx: 7/13/19  LOV: 8/16/19    Requested Prescriptions   Pending Prescriptions Disp Refills   • Zolpidem Tartrate 10 MG Oral

## 2019-11-26 ENCOUNTER — OFFICE VISIT (OUTPATIENT)
Dept: OPTOMETRY | Facility: CLINIC | Age: 64
End: 2019-11-26
Payer: MEDICARE

## 2019-11-26 DIAGNOSIS — H25.13 AGE-RELATED NUCLEAR CATARACT OF BOTH EYES: ICD-10-CM

## 2019-11-26 DIAGNOSIS — H35.371 MACULAR PUCKER, RIGHT: ICD-10-CM

## 2019-11-26 DIAGNOSIS — E11.9 CONTROLLED TYPE 2 DIABETES MELLITUS WITHOUT COMPLICATION, WITHOUT LONG-TERM CURRENT USE OF INSULIN (HCC): Primary | ICD-10-CM

## 2019-11-26 PROCEDURE — 92014 COMPRE OPH EXAM EST PT 1/>: CPT | Performed by: OPTOMETRIST

## 2019-11-26 NOTE — PATIENT INSTRUCTIONS
Macular pucker, right  No treatment is required. Will continue to observe.     Diabetes type 2, controlled ([address]  I advised patient that there is no background diabetic retinopathy in either eye and that they should continue to keep their blood sugar under

## 2019-11-26 NOTE — PROGRESS NOTES
Nannette Thomas is a 71year old female. HPI:     HPI     Diabetic Eye Exam     Diabetes characteristics include Type 2, controlled with diet and taking oral medications. Duration of 5 years. Number of years diabetic 5.   Number of years on pills 5 EVERY DAY 90 tablet 1   • METFORMIN HCL  MG Oral Tablet 24 Hr TAKE 1 TABLET BY MOUTH TWICE A DAY WITH FOOD (Patient taking differently: Take 500 mg by mouth daily with breakfast. TAKE 1 TABLET BY MOUTH TWICE A DAY WITH FOOD ) 180 tablet 1   • atorvas 0.1    Macula mild ERM nasal to fovea - no BDR Normal- no BDR    Vessels Normal Normal    Periphery Normal Normal            Refraction     Wearing Rx       Sphere Cylinder Axis Add    Right -1.50 -1.00 100 +2.50    Left -2.50 -0.50 090 +2.50    Type:  Pro

## 2019-12-05 ENCOUNTER — APPOINTMENT (OUTPATIENT)
Dept: LAB | Age: 64
End: 2019-12-05
Attending: INTERNAL MEDICINE
Payer: MEDICARE

## 2019-12-05 DIAGNOSIS — N17.9 AKI (ACUTE KIDNEY INJURY) (HCC): ICD-10-CM

## 2019-12-05 PROCEDURE — 80069 RENAL FUNCTION PANEL: CPT

## 2019-12-05 PROCEDURE — 36415 COLL VENOUS BLD VENIPUNCTURE: CPT

## 2019-12-05 NOTE — TELEPHONE ENCOUNTER
Patient due for colonoscopy. Left message to call back [phone].
Quality 110: Preventive Care And Screening: Influenza Immunization: Influenza Immunization Administered during Influenza season
Quality 111:Pneumonia Vaccination Status For Older Adults: Pneumococcal Vaccination Previously Received
Detail Level: Detailed
Quality 131: Pain Assessment And Follow-Up: No documentation of pain assessment, reason not given
Quality 130: Documentation Of Current Medications In The Medical Record: Current Medications Documented

## 2020-03-04 NOTE — TELEPHONE ENCOUNTER
Review pended refill request as it does not fall under a protocol.     Last Rx: 11/8/19  LOV: 6 months ago

## 2020-03-05 RX ORDER — ZOLPIDEM TARTRATE 10 MG/1
TABLET ORAL
Qty: 30 TABLET | Refills: 1 | Status: SHIPPED | OUTPATIENT
Start: 2020-03-05 | End: 2020-07-01

## 2020-04-06 RX ORDER — METFORMIN HYDROCHLORIDE 500 MG/1
500 TABLET, EXTENDED RELEASE ORAL
Qty: 90 TABLET | Refills: 0 | Status: SHIPPED | OUTPATIENT
Start: 2020-04-06 | End: 2020-07-01

## 2020-04-06 RX ORDER — FUROSEMIDE 20 MG/1
TABLET ORAL
Qty: 90 TABLET | Refills: 0 | Status: SHIPPED | OUTPATIENT
Start: 2020-04-06 | End: 2020-07-02

## 2020-06-28 ENCOUNTER — TELEPHONE (OUTPATIENT)
Dept: FAMILY MEDICINE CLINIC | Facility: CLINIC | Age: 65
End: 2020-06-28

## 2020-07-01 RX ORDER — METFORMIN HYDROCHLORIDE 500 MG/1
TABLET, EXTENDED RELEASE ORAL
Qty: 90 TABLET | Refills: 0 | Status: SHIPPED | OUTPATIENT
Start: 2020-07-01 | End: 2020-07-02

## 2020-07-01 RX ORDER — ZOLPIDEM TARTRATE 10 MG/1
TABLET ORAL
Qty: 30 TABLET | Refills: 1 | Status: SHIPPED | OUTPATIENT
Start: 2020-07-01 | End: 2020-10-23

## 2020-07-02 RX ORDER — METFORMIN HYDROCHLORIDE 500 MG/1
500 TABLET, EXTENDED RELEASE ORAL 2 TIMES DAILY WITH MEALS
Qty: 180 TABLET | Refills: 0 | Status: SHIPPED | OUTPATIENT
Start: 2020-07-02 | End: 2020-10-23

## 2020-07-02 RX ORDER — ATORVASTATIN CALCIUM 20 MG/1
TABLET, FILM COATED ORAL
Qty: 90 TABLET | Refills: 1 | Status: SHIPPED | OUTPATIENT
Start: 2020-07-02 | End: 2020-12-18

## 2020-07-02 RX ORDER — FUROSEMIDE 20 MG/1
TABLET ORAL
Qty: 90 TABLET | Refills: 0 | Status: SHIPPED | OUTPATIENT
Start: 2020-07-02 | End: 2020-10-23

## 2020-09-11 NOTE — TELEPHONE ENCOUNTER
Patient is requesting a new order for CPAP supplies sent to [facility]. Patient stated her referral has .   Please advise

## 2020-10-19 ENCOUNTER — LAB ENCOUNTER (OUTPATIENT)
Dept: LAB | Age: 65
End: 2020-10-19
Attending: FAMILY MEDICINE
Payer: MEDICARE

## 2020-10-19 DIAGNOSIS — E66.9 DIABETES MELLITUS TYPE 2 IN OBESE (HCC): ICD-10-CM

## 2020-10-19 DIAGNOSIS — E11.69 DIABETES MELLITUS TYPE 2 IN OBESE (HCC): ICD-10-CM

## 2020-10-19 PROCEDURE — 82570 ASSAY OF URINE CREATININE: CPT | Performed by: FAMILY MEDICINE

## 2020-10-19 PROCEDURE — 80061 LIPID PANEL: CPT

## 2020-10-19 PROCEDURE — 80053 COMPREHEN METABOLIC PANEL: CPT

## 2020-10-19 PROCEDURE — 83036 HEMOGLOBIN GLYCOSYLATED A1C: CPT

## 2020-10-19 PROCEDURE — 82043 UR ALBUMIN QUANTITATIVE: CPT | Performed by: FAMILY MEDICINE

## 2020-10-19 PROCEDURE — 36415 COLL VENOUS BLD VENIPUNCTURE: CPT

## 2020-10-19 PROCEDURE — 84443 ASSAY THYROID STIM HORMONE: CPT

## 2020-10-23 ENCOUNTER — OFFICE VISIT (OUTPATIENT)
Dept: FAMILY MEDICINE CLINIC | Facility: CLINIC | Age: 65
End: 2020-10-23
Payer: MEDICARE

## 2020-10-23 VITALS
SYSTOLIC BLOOD PRESSURE: 126 MMHG | DIASTOLIC BLOOD PRESSURE: 84 MMHG | HEIGHT: 59 IN | BODY MASS INDEX: 59.07 KG/M2 | HEART RATE: 90 BPM | WEIGHT: 293 LBS

## 2020-10-23 DIAGNOSIS — E66.9 DIABETES MELLITUS TYPE 2 IN OBESE (HCC): ICD-10-CM

## 2020-10-23 DIAGNOSIS — I10 HTN (HYPERTENSION), BENIGN: ICD-10-CM

## 2020-10-23 DIAGNOSIS — N95.9 MENOPAUSAL DISORDER: ICD-10-CM

## 2020-10-23 DIAGNOSIS — E66.01 MORBID OBESITY WITH BMI OF 60.0-69.9, ADULT (HCC): ICD-10-CM

## 2020-10-23 DIAGNOSIS — E66.2 OBESITY HYPOVENTILATION SYNDROME (HCC): ICD-10-CM

## 2020-10-23 DIAGNOSIS — Z12.39 ENCOUNTER FOR SCREENING FOR MALIGNANT NEOPLASM OF BREAST, UNSPECIFIED SCREENING MODALITY: Primary | ICD-10-CM

## 2020-10-23 DIAGNOSIS — Z00.00 ENCOUNTER FOR ANNUAL HEALTH EXAMINATION: ICD-10-CM

## 2020-10-23 DIAGNOSIS — E11.69 DIABETES MELLITUS TYPE 2 IN OBESE (HCC): ICD-10-CM

## 2020-10-23 DIAGNOSIS — E11.9 DIABETES MELLITUS TYPE 2 WITHOUT RETINOPATHY (HCC): ICD-10-CM

## 2020-10-23 DIAGNOSIS — M19.90 OSTEOARTHRITIS, UNSPECIFIED OSTEOARTHRITIS TYPE, UNSPECIFIED SITE: Chronic | ICD-10-CM

## 2020-10-23 PROCEDURE — 3074F SYST BP LT 130 MM HG: CPT | Performed by: FAMILY MEDICINE

## 2020-10-23 PROCEDURE — 3079F DIAST BP 80-89 MM HG: CPT | Performed by: FAMILY MEDICINE

## 2020-10-23 PROCEDURE — 3008F BODY MASS INDEX DOCD: CPT | Performed by: FAMILY MEDICINE

## 2020-10-23 PROCEDURE — 99214 OFFICE O/P EST MOD 30 MIN: CPT | Performed by: FAMILY MEDICINE

## 2020-10-23 PROCEDURE — G0008 ADMIN INFLUENZA VIRUS VAC: HCPCS | Performed by: FAMILY MEDICINE

## 2020-10-23 PROCEDURE — 90662 IIV NO PRSV INCREASED AG IM: CPT | Performed by: FAMILY MEDICINE

## 2020-10-23 PROCEDURE — 90472 IMMUNIZATION ADMIN EACH ADD: CPT | Performed by: FAMILY MEDICINE

## 2020-10-23 PROCEDURE — 90750 HZV VACC RECOMBINANT IM: CPT | Performed by: FAMILY MEDICINE

## 2020-10-23 RX ORDER — METFORMIN HYDROCHLORIDE 500 MG/1
500 TABLET, EXTENDED RELEASE ORAL
Qty: 90 TABLET | Refills: 1 | Status: SHIPPED | OUTPATIENT
Start: 2020-10-23 | End: 2021-04-20

## 2020-10-23 RX ORDER — FUROSEMIDE 20 MG/1
20 TABLET ORAL DAILY
Qty: 90 TABLET | Refills: 1 | Status: SHIPPED | OUTPATIENT
Start: 2020-10-23 | End: 2021-04-15

## 2020-10-23 RX ORDER — ZOLPIDEM TARTRATE 10 MG/1
TABLET ORAL
Qty: 30 TABLET | Refills: 1 | Status: SHIPPED | OUTPATIENT
Start: 2020-10-23 | End: 2021-03-01

## 2020-10-23 NOTE — PROGRESS NOTES
HPI:   Bia Jung is a 76year old female who presents for a MA (Medicare Advantage) [address] [place] [address] (Once per calendar year).       Her last annual assessment has been over 1 year: Annual Physical due on 04/10/1958         Fall/Risk Assessment Update n care planning including the explanation and discussion of advance directives standard forms performed Face to Face with patient and Family/surrogate (if present), and forms available to patient in AVS       She has never smoked tobacco.    CAGE Alcohol scr (500 mg total) by mouth daily with breakfast.    •  Zolpidem Tartrate 10 MG Oral Tab, TAKE 1 TABLET BY MOUTH AT BEDTIME    •  ATORVASTATIN 20 MG Oral Tab, TAKE 1 TABLET BY MOUTH EVERYDAY AT BEDTIME       MEDICAL INFORMATION:   She  has a past medical histo anemia  ENDOCRINE: denies thyroid history  ALL/ASTHMA: denies hx of allergy or asthma    EXAM:   /84 (BP Location: Right arm)   Pulse 90   Ht 4' 11\" (1.499 m)   Wt (!) 313 lb 9.6 oz (142.2 kg)   BMI 63.34 kg/m²  Estimated body mass index is 63.34 kg Administered Date(s) Administered   • >=3 YRS FLUZONE OR FLUARIX QUAD PRESERVE FREE SINGLE DOSE (91200) FLU CLINIC 11/06/2015, 11/23/2016   • FLU VAC High Dose 65 YRS & Older PRSV Free (67720) 10/23/2019, 10/23/2020   • FLULAVAL 6 months & older 0.5 ml P and agrees to the plan. Reinforced healthy diet, lifestyle, and exercise. No follow-ups on file.      Calderon Ferraro MD, 10/23/2020     General Health            This section provided for quick review of chart, separate sheet to patient  PREVENTATIVE SER Influenza  Covered Annually 10/23/2020 Please get every year    Pneumococcal 13 (Prevnar)  Covered Once after 65 10/31/2018 Please get once after your 65th birthday    Pneumococcal 23 (Pneumovax)  Covered Once after 65 10/23/2019 Please get once after your [address] ASSESSMENT FEMALE [[id number]]

## 2020-10-23 NOTE — PATIENT INSTRUCTIONS
Faiza Zamora SCREENING SCHEDULE   Tests on this list are recommended by your physician but may not be covered, or covered at this frequency, by your insurer. Please check with your insurance carrier before scheduling to verify coverage.    PREVEN every 10 years- more often if abnormal Colonoscopy due on 03/22/2022 Update [facility] if applicable    Flex Sigmoidoscopy Screen  Covered every 5 years No results found for this or any previous visit. No flowsheet data found.      Fecal Occult Bloo FLU VAC NO PRSV 4 LAUREN 3 YRS+   Orders placed or performed in visit on 11/06/15   • FLU VAC NO PRSV 4 LAUREN 3 YRS+    Please get every year    Pneumococcal 13 (Prevnar)  Covered Once after 65 Orders placed or performed in visit on 10/31/18   • PNEUMOCOCCAL VA [place])  www. [email or web address] org  This link also has information from the [address] [place] [address] regarding Advance Directives.

## 2020-12-18 RX ORDER — ATORVASTATIN CALCIUM 20 MG/1
TABLET, FILM COATED ORAL
Qty: 90 TABLET | Refills: 1 | Status: SHIPPED | OUTPATIENT
Start: 2020-12-18 | End: 2021-06-13

## 2021-02-02 DIAGNOSIS — Z23 NEED FOR VACCINATION: ICD-10-CM

## 2021-02-08 ENCOUNTER — IMMUNIZATION (OUTPATIENT)
Dept: LAB | Age: 66
End: 2021-02-08
Attending: HOSPITALIST
Payer: MEDICARE

## 2021-02-08 DIAGNOSIS — Z23 NEED FOR VACCINATION: Primary | ICD-10-CM

## 2021-02-08 PROCEDURE — 0001A SARSCOV2 VAC 30MCG/0.3ML IM: CPT

## 2021-03-01 ENCOUNTER — IMMUNIZATION (OUTPATIENT)
Dept: LAB | Age: 66
End: 2021-03-01
Attending: HOSPITALIST
Payer: MEDICARE

## 2021-03-01 DIAGNOSIS — Z23 NEED FOR VACCINATION: Primary | ICD-10-CM

## 2021-03-01 PROCEDURE — 0002A SARSCOV2 VAC 30MCG/0.3ML IM: CPT

## 2021-03-02 RX ORDER — ZOLPIDEM TARTRATE 10 MG/1
TABLET ORAL
Qty: 30 TABLET | Refills: 1 | Status: SHIPPED | OUTPATIENT
Start: 2021-03-02 | End: 2021-07-01

## 2021-03-24 ENCOUNTER — TELEPHONE (OUTPATIENT)
Dept: FAMILY MEDICINE CLINIC | Facility: CLINIC | Age: 66
End: 2021-03-24

## 2021-04-08 ENCOUNTER — NURSE ONLY (OUTPATIENT)
Dept: FAMILY MEDICINE CLINIC | Facility: CLINIC | Age: 66
End: 2021-04-08
Payer: MEDICARE

## 2021-04-08 ENCOUNTER — TELEPHONE (OUTPATIENT)
Dept: FAMILY MEDICINE CLINIC | Facility: CLINIC | Age: 66
End: 2021-04-08

## 2021-04-08 DIAGNOSIS — Z23 IMMUNIZATION DUE: Primary | ICD-10-CM

## 2021-04-08 PROCEDURE — 90471 IMMUNIZATION ADMIN: CPT | Performed by: FAMILY MEDICINE

## 2021-04-08 PROCEDURE — 90750 HZV VACC RECOMBINANT IM: CPT | Performed by: FAMILY MEDICINE

## 2021-04-15 RX ORDER — FUROSEMIDE 20 MG/1
TABLET ORAL
Qty: 90 TABLET | Refills: 1 | Status: SHIPPED | OUTPATIENT
Start: 2021-04-15 | End: 2021-10-07

## 2021-04-20 RX ORDER — METFORMIN HYDROCHLORIDE 500 MG/1
TABLET, EXTENDED RELEASE ORAL
Qty: 90 TABLET | Refills: 1 | Status: SHIPPED | OUTPATIENT
Start: 2021-04-20 | End: 2021-10-25

## 2021-04-22 ENCOUNTER — TELEPHONE (OUTPATIENT)
Dept: FAMILY MEDICINE CLINIC | Facility: CLINIC | Age: 66
End: 2021-04-22

## 2021-04-22 DIAGNOSIS — G47.33 OSA (OBSTRUCTIVE SLEEP APNEA): Primary | ICD-10-CM

## 2021-04-22 NOTE — TELEPHONE ENCOUNTER
Pt would like an order/referral for CPAP supplies. Patient, states that she needs everything for the CPAP.

## 2021-06-01 ENCOUNTER — TELEPHONE (OUTPATIENT)
Dept: FAMILY MEDICINE CLINIC | Facility: CLINIC | Age: 66
End: 2021-06-01

## 2021-06-01 NOTE — TELEPHONE ENCOUNTER
Per patient she would like an excuse letter not to do a [facility] but she would like to talk to a nurse about it first.  Please call her at her [phone]. Patient she would like to talk to a nurse as soon as possible.

## 2021-06-09 ENCOUNTER — LAB ENCOUNTER (OUTPATIENT)
Dept: LAB | Age: 66
End: 2021-06-09
Attending: FAMILY MEDICINE
Payer: MEDICARE

## 2021-06-09 DIAGNOSIS — E66.9 DIABETES MELLITUS TYPE 2 IN OBESE (HCC): ICD-10-CM

## 2021-06-09 DIAGNOSIS — E11.69 DIABETES MELLITUS TYPE 2 IN OBESE (HCC): ICD-10-CM

## 2021-06-09 PROCEDURE — 80048 BASIC METABOLIC PNL TOTAL CA: CPT

## 2021-06-09 PROCEDURE — 83036 HEMOGLOBIN GLYCOSYLATED A1C: CPT

## 2021-06-09 PROCEDURE — 36415 COLL VENOUS BLD VENIPUNCTURE: CPT

## 2021-06-13 RX ORDER — ATORVASTATIN CALCIUM 20 MG/1
TABLET, FILM COATED ORAL
Qty: 90 TABLET | Refills: 1 | Status: SHIPPED | OUTPATIENT
Start: 2021-06-13 | End: 2021-12-02

## 2021-06-16 ENCOUNTER — OFFICE VISIT (OUTPATIENT)
Dept: FAMILY MEDICINE CLINIC | Facility: CLINIC | Age: 66
End: 2021-06-16
Payer: MEDICARE

## 2021-06-16 VITALS
HEIGHT: 59 IN | WEIGHT: 293 LBS | RESPIRATION RATE: 19 BRPM | DIASTOLIC BLOOD PRESSURE: 79 MMHG | SYSTOLIC BLOOD PRESSURE: 120 MMHG | BODY MASS INDEX: 59.07 KG/M2

## 2021-06-16 DIAGNOSIS — M25.562 PAIN IN BOTH KNEES, UNSPECIFIED CHRONICITY: Primary | ICD-10-CM

## 2021-06-16 DIAGNOSIS — M25.561 PAIN IN BOTH KNEES, UNSPECIFIED CHRONICITY: Primary | ICD-10-CM

## 2021-06-16 PROCEDURE — 3074F SYST BP LT 130 MM HG: CPT | Performed by: FAMILY MEDICINE

## 2021-06-16 PROCEDURE — 3078F DIAST BP <80 MM HG: CPT | Performed by: FAMILY MEDICINE

## 2021-06-16 PROCEDURE — 3008F BODY MASS INDEX DOCD: CPT | Performed by: FAMILY MEDICINE

## 2021-06-16 PROCEDURE — 99214 OFFICE O/P EST MOD 30 MIN: CPT | Performed by: FAMILY MEDICINE

## 2021-06-20 NOTE — PROGRESS NOTES
HPI/Subjective:   Patient ID: Samantha Munoz is a 78year old female. Name: Samantha Munoz  Date: 6/20/2021    Referring Physician: No ref.  provider found    HISTORY OF PRESENT ILLNESS   Samantha Munoz is a 78year old female who presents Allergies:  Penicillins                 Comment:Other reaction(s): Rash    Objective:   Physical Exam  Constitutional:       Appearance: She is well-developed.       Comments: overweight   Cardiovascular:      Rate and Rhythm: Normal rate and regular rh

## 2021-07-01 RX ORDER — ZOLPIDEM TARTRATE 10 MG/1
TABLET ORAL
Qty: 30 TABLET | Refills: 1 | Status: SHIPPED | OUTPATIENT
Start: 2021-07-01 | End: 2021-11-01

## 2021-10-07 NOTE — TELEPHONE ENCOUNTER
Refill passed per [address] [place] protocol.   Requested Prescriptions   Pending Prescriptions Disp Refills    FUROSEMIDE 20 MG Oral Tab [Pharmacy Med Name: FUROSEMIDE 20 MG TABLET] 90 tablet 1     Sig: TAKE 1 TABLET BY MOUTH EVERY DAY        Hypertensive Medications Protocol Passed - 10/7/2021 12:01 AM        Passed - CMP or BMP in past 12 months        Passed - Appointment in past 6 or next 3 months        Passed - GFR Non- > 50     Lab Results   Component Value Date    GFRNAA 72 06/09/2021                        Recent Outpatient Visits              3 months ago Pain in both knees, unspecified chronicity    [facility], [address] MD    Office Visit    6 months ago Immunization due    [address], [address] [facility]    Nurse Only    11 months ago Encounter for screening for malignant neoplasm of breast, unspecified screening modality    Akilah Herr MD    Office Visit    1 year ago Controlled type 2 diabetes mellitus without complication, without long-term current use of insulin [facility] for [place]    Office Visit    1 year ago Need for influenza vaccination    [address] [place], [facility]    Nurse Only

## 2021-10-25 NOTE — TELEPHONE ENCOUNTER
Refill passed per [facility] protocol.   Requested Prescriptions   Pending Prescriptions Disp Refills    metFORMIN HCl  MG Oral Tablet 24 Hr 90 tablet 1     Sig: Take 1 tablet (500 mg total) by mouth daily with breakfast.        Diabetes Medication Protocol Passed - 10/25/2021 11:31 AM        Passed - Last A1C < 7.5 and within past 6 months     Lab Results   Component Value Date    A1C 5.9 (H) 06/09/2021               Passed - Appointment in past 6 or next 3 months        Passed - GFR Non- > 50     Lab Results   Component Value Date    GFRNAA 72 06/09/2021                 Passed - GFR in the past 12 months               Recent Outpatient Visits              4 months ago Pain in both knees, unspecified chronicity    Zina Lopez MD    Office Visit    6 months ago Immunization due    [email or web address], [address]    Nurse Only    1 year ago Encounter for screening for malignant neoplasm of breast, unspecified screening modality    Zina Lopez MD    Office Visit    1 year ago Controlled type 2 diabetes mellitus without complication, without long-term current use of insulin [facility] [place] [facility] for P[place]    Office Visit    2 years ago Need for influenza vaccination    [facility], [address]    Nurse Only

## 2021-11-01 RX ORDER — ZOLPIDEM TARTRATE 10 MG/1
TABLET ORAL
Qty: 30 TABLET | Refills: 1 | Status: SHIPPED | OUTPATIENT
Start: 2021-11-01

## 2021-11-01 NOTE — TELEPHONE ENCOUNTER
Please review; protocol failed/no protocol.      Requested Prescriptions   Pending Prescriptions Disp Refills    zolpidem 10 MG Oral Tab 30 tablet 1     Sig: TAKE 1 TABLET BY MOUTH EVERYDAY AT BEDTIME        There is no refill protocol information for this

## 2021-11-02 ENCOUNTER — IMMUNIZATION (OUTPATIENT)
Dept: FAMILY MEDICINE CLINIC | Facility: CLINIC | Age: 66
End: 2021-11-02
Payer: MEDICARE

## 2021-11-02 DIAGNOSIS — Z23 NEED FOR VACCINATION: Primary | ICD-10-CM

## 2021-11-02 PROCEDURE — G0008 ADMIN INFLUENZA VIRUS VAC: HCPCS | Performed by: FAMILY MEDICINE

## 2021-11-02 PROCEDURE — 90662 IIV NO PRSV INCREASED AG IM: CPT | Performed by: FAMILY MEDICINE

## 2021-12-17 ENCOUNTER — TELEPHONE (OUTPATIENT)
Dept: FAMILY MEDICINE CLINIC | Facility: CLINIC | Age: 66
End: 2021-12-17

## 2021-12-17 DIAGNOSIS — E11.69 DIABETES MELLITUS TYPE 2 IN OBESE (HCC): Primary | ICD-10-CM

## 2021-12-17 DIAGNOSIS — E66.9 DIABETES MELLITUS TYPE 2 IN OBESE (HCC): Primary | ICD-10-CM

## 2021-12-17 NOTE — TELEPHONE ENCOUNTER
Spoke to [employer] at Registration. Patient was in office trying to get labs done. She said that Dr. Jaziel Edge wants her to get her blood checked every 6 months. She thinks it is her bmp and A1C. She will await call back from office.      Dr. Jaziel Edge, please a

## 2021-12-21 ENCOUNTER — LAB ENCOUNTER (OUTPATIENT)
Dept: LAB | Age: 66
End: 2021-12-21
Attending: FAMILY MEDICINE
Payer: MEDICARE

## 2021-12-21 DIAGNOSIS — E66.9 DIABETES MELLITUS TYPE 2 IN OBESE (HCC): ICD-10-CM

## 2021-12-21 DIAGNOSIS — E11.69 DIABETES MELLITUS TYPE 2 IN OBESE (HCC): ICD-10-CM

## 2021-12-21 PROCEDURE — 82043 UR ALBUMIN QUANTITATIVE: CPT

## 2021-12-21 PROCEDURE — 36415 COLL VENOUS BLD VENIPUNCTURE: CPT

## 2021-12-21 PROCEDURE — 80053 COMPREHEN METABOLIC PANEL: CPT

## 2021-12-21 PROCEDURE — 82570 ASSAY OF URINE CREATININE: CPT

## 2021-12-21 PROCEDURE — 83036 HEMOGLOBIN GLYCOSYLATED A1C: CPT

## 2021-12-21 PROCEDURE — 85025 COMPLETE CBC W/AUTO DIFF WBC: CPT

## 2021-12-21 PROCEDURE — 84443 ASSAY THYROID STIM HORMONE: CPT

## 2021-12-28 RX ORDER — ATORVASTATIN CALCIUM 20 MG/1
TABLET, FILM COATED ORAL
Qty: 30 TABLET | Refills: 0 | Status: SHIPPED | OUTPATIENT
Start: 2021-12-28 | End: 2022-01-19

## 2022-01-19 RX ORDER — ATORVASTATIN CALCIUM 20 MG/1
TABLET, FILM COATED ORAL
Qty: 30 TABLET | Refills: 0 | Status: SHIPPED | OUTPATIENT
Start: 2022-01-19

## 2022-01-19 NOTE — TELEPHONE ENCOUNTER
Refill passed per [facility], [facility] protocol.     Requested Prescriptions   Pending Prescriptions Disp Refills    ATORVASTATIN 20 MG Oral Tab [Pharmacy Med Name: ATORVASTATIN 20 MG TABLET] 30 tablet 0     Sig: TAKE 1 TABLET BY MOUTH EVERYDAY AT BEDTIME

## 2022-01-27 ENCOUNTER — TELEPHONE (OUTPATIENT)
Dept: GASTROENTEROLOGY | Facility: CLINIC | Age: 67
End: 2022-01-27

## 2022-01-27 NOTE — TELEPHONE ENCOUNTER
----- Message from Louann Koyanagi, RN sent at 3/27/2019 11:36 AM CDT -----  Regarding: Recall colon in 3 years per Dr. Isael Kumari. Colon done 3/22/19  Recall colon in 3 years per Dr. Isael Kumari.  Colon done 3/22/19

## 2022-02-14 RX ORDER — ATORVASTATIN CALCIUM 20 MG/1
TABLET, FILM COATED ORAL
Qty: 30 TABLET | Refills: 0 | Status: SHIPPED | OUTPATIENT
Start: 2022-02-14 | End: 2022-03-10

## 2022-03-03 RX ORDER — ZOLPIDEM TARTRATE 10 MG/1
TABLET ORAL
Qty: 30 TABLET | Refills: 1 | Status: SHIPPED | OUTPATIENT
Start: 2022-03-03

## 2022-03-03 NOTE — TELEPHONE ENCOUNTER
Please review; protocol failed/ no protocol.     Requested Prescriptions   Pending Prescriptions Disp Refills    zolpidem 10 MG Oral Tab 30 tablet 1     Sig: TAKE 1 TABLET BY MOUTH EVERYDAY AT BEDTIME        There is no refill protocol information for this order           Recent Outpatient Visits              8 months ago Pain in both knees, unspecified chronicity    Leona Fields MD    Office Visit    10 months ago Immunization due    [address] [place], [address] [facility]    Nurse Only    1 year ago Encounter for screening for malignant neoplasm of breast, unspecified screening modality    Leona Fields MD    Office Visit    2 years ago Controlled type 2 diabetes mellitus without complication, without long-term current use of insulin [facility] [place] [facility] for [address] [place]    Office Visit    2 years ago Need for influenza vaccination    [address] [place], [address] [facility]    Nurse Only

## 2022-03-10 RX ORDER — ATORVASTATIN CALCIUM 20 MG/1
TABLET, FILM COATED ORAL
Qty: 90 TABLET | Refills: 1 | Status: SHIPPED | OUTPATIENT
Start: 2022-03-10

## 2022-03-10 NOTE — TELEPHONE ENCOUNTER
Please review. Protocol Failed or has No Protocol.     Requested Prescriptions   Pending Prescriptions Disp Refills    ATORVASTATIN 20 MG Oral Tab [Pharmacy Med Name: ATORVASTATIN 20 MG TABLET] 30 tablet 0     Sig: TAKE 1 TABLET BY MOUTH EVERYDAY AT BEDTIME        Cholesterol Medication Protocol Failed - 3/10/2022 11:30 AM        Failed - LDL in past 12 months        Failed - Last LDL < 130     Lab Results   Component Value Date    LDL 78 10/19/2020               Passed - ALT in past 12 months        Passed - Last ALT < 80       Lab Results   Component Value Date    ALT 21 12/21/2021             Passed - Appointment in past 12 or next 3 months                  Recent Outpatient Visits              8 months ago Pain in both knees, unspecified chronicity    Renald Spatz, MD    Office Visit    11 months ago [facility]ization [facility]    [facility], [facility]    Nurse Only    1 year ago Encounter for screening for malignant neoplasm of breast, unspecified screening modality    Renald Spatz, MD    Office Visit    2 years ago Controlled type 2 diabetes mellitus without complication, without long-term current use of insulin [facility] [place] [facility] for [address] [place]    Office Visit    2 years ago Need for influenza vaccination    [facility], [address] [facility]    Nurse Only

## 2022-03-25 NOTE — TELEPHONE ENCOUNTER
Patient dropped off parking placard form at [address] [facility],  [address] . Please call patiant to inform when completed and mailed out.   Form placed in Dr. Margoth Posada mail bin

## 2022-03-28 NOTE — TELEPHONE ENCOUNTER
Refill passed per [facility] protocol.    Requested Prescriptions   Pending Prescriptions Disp Refills    FUROSEMIDE 20 MG Oral Tab [Pharmacy Med Name: FUROSEMIDE 20 MG TABLET] 90 tablet 1     Sig: TAKE 1 TABLET BY MOUTH EVERY DAY        Hypertensive Medications Protocol Failed - 3/27/2022  5:41 PM        Failed - Appointment in past 6 or next 3 months        Passed - CMP or BMP in past 12 months        Passed - GFR Non- > 50     Lab Results   Component Value Date    GFRNAA 60 12/21/2021                        Recent Outpatient Visits              9 months ago Pain in both knees, unspecified chronicity    Caitlin Orellana MD    Office Visit    11 months ago Immunization due    [facility], [address]    Nurse Only    1 year ago Encounter for screening for malignant neoplasm of breast, unspecified screening modality    Caitlin Orellana MD    Office Visit    2 years ago Controlled type 2 diabetes mellitus without complication, without long-term current use of insulin [facility] [place] [facility] for [address]    Office Visit    2 years ago Need for influenza vaccination    [facility], [address]    Nurse Only

## 2022-03-28 NOTE — TELEPHONE ENCOUNTER
Pt informed placard is ready for  at the  pt stated to mailed it out for her I did informed her mail goes to the main Hospital and then gets distributed from there. Copy sent to scanning.

## 2022-03-29 NOTE — TELEPHONE ENCOUNTER
1st attempt - [employer] message sent for patient to contact the office to schedule an appointment; see notes below.

## 2022-04-18 RX ORDER — METFORMIN HYDROCHLORIDE 500 MG/1
TABLET, EXTENDED RELEASE ORAL
Qty: 90 TABLET | Refills: 0 | Status: SHIPPED | OUTPATIENT
Start: 2022-04-18

## 2022-04-19 NOTE — TELEPHONE ENCOUNTER
Pt calling regarding metformin that was denied. Please review pended med and advise. Refill failed protocol due to creatinine above 1.5    Also - pt needs another referral for nephro - referral pended - please review and advise. No

## 2022-06-10 NOTE — TELEPHONE ENCOUNTER
Refill passed per [facility] protocol.     Requested Prescriptions   Pending Prescriptions Disp Refills    METFORMIN  MG Oral Tablet 24 Hr [Pharmacy Med Name: METFORMIN HCL  MG TABLET] 90 tablet 0     Sig: TAKE 1 TABLET BY MOUTH EVERY DAY WITH BREAKFAST        Diabetes Medication Protocol Failed - 6/10/2022 12:11 PM        Failed - Appointment in past 6 or next 3 months        Passed - Last A1C < 7.5 and within past 6 months     Lab Results   Component Value Date    A1C 6.0 (H) 12/21/2021               Passed - GFR Non- > 50     Lab Results   Component Value Date    GFRNAA 60 12/21/2021                 Passed - GFR in the past 12 months                  Recent Outpatient Visits              11 months ago Pain in both knees, unspecified chronicity    Judd Klein MD    Office Visit    1 year ago Immunization due    [facility], [address]    Nurse Only    1 year ago Encounter for screening for malignant neoplasm of breast, unspecified screening modality    Judd Klein MD    Office Visit    2 years ago Controlled type 2 diabetes mellitus without complication, without long-term current use of insulin [facility] [place] [facility] for [address] [place]    Office Visit    2 years ago Need for influenza vaccination    [facility], [address]    Nurse Only

## 2022-06-25 RX ORDER — FUROSEMIDE 20 MG/1
TABLET ORAL
Qty: 90 TABLET | Refills: 0 | Status: SHIPPED | OUTPATIENT
Start: 2022-06-25

## 2022-06-28 RX ORDER — ZOLPIDEM TARTRATE 10 MG/1
TABLET ORAL
Qty: 30 TABLET | Refills: 1 | Status: SHIPPED | OUTPATIENT
Start: 2022-06-28

## 2022-08-24 NOTE — TELEPHONE ENCOUNTER
Patient returned call and agreed to schedule colonoscopy. Call transferred to gastro scheduling. DISPLAY PLAN FREE TEXT DISPLAY PLAN FREE TEXT DISPLAY PLAN FREE TEXT

## 2022-09-12 NOTE — TELEPHONE ENCOUNTER
Please review. Protocol failed / No protocol.     Requested Prescriptions   Pending Prescriptions Disp Refills    ATORVASTATIN 20 MG Oral Tab [Pharmacy Med Name: ATORVASTATIN 20 MG TABLET] 90 tablet 1     Sig: TAKE 1 TABLET BY MOUTH EVERYDAY AT BEDTIME        Cholesterol Medication Protocol Failed - 9/12/2022 11:52 AM        Failed - LDL in past 12 months        Failed - Last LDL < 130     Lab Results   Component Value Date    LDL 78 10/19/2020               Failed - In person appointment or virtual visit in the past 12 mos or appointment in next 3 mos       Recent Outpatient Visits              1 year ago Pain in both knees, unspecified chronicity    Maine Bass MD    Office Visit    1 year ago Immunization due    [facility]    Nurse Only    1 year ago Encounter for screening for malignant neoplasm of breast, unspecified screening modality    [facility]    Office Visit    2 years ago Controlled type 2 diabetes mellitus without complication, without long-term current use of insulin [facility] [place]    Office Visit    2 years ago Need for influenza vaccination    [facility]    Nurse Only                 Passed - ALT in past 12 months        Passed - Last ALT < 80       Lab Results   Component Value Date    ALT 21 12/21/2021                       Recent Outpatient Visits              1 year ago Pain in both knees, unspecified chronicity    Maine Bass MD    Office Visit    1 year ago Immunization due    [facility]    Nurse Only    1 year ago Encounter for screening for malignant neoplasm of breast, unspecified screening modality    Maine Bass MD    Office Visit    2 years ago Controlled type 2 diabetes mellitus without complication, without long-term current use of insulin [facility])    [place] [facility] [place] [facility] for [place], Pérez [place]    Office Visit    2 years ago Need for influenza vaccination    [facility], [facility], [address]    Nurse Only

## 2022-09-22 NOTE — TELEPHONE ENCOUNTER
Please review. Protocol failed or has no protocol. Dr Jerald Viera out of the office till 10/3/2022. Requested Prescriptions   Pending Prescriptions Disp Refills    FUROSEMIDE 20 MG Oral Tab [Pharmacy Med Name: FUROSEMIDE 20 MG TABLET] 90 tablet 0     Sig: TAKE 1 TABLET BY MOUTH EVERY DAY        Hypertensive Medications Protocol Failed - 9/22/2022 10:23 AM        Failed - In person appointment in the past 12 or next 3 months       Recent Outpatient Visits              1 year ago Pain in both knees, unspecified chronicity    Kaylynn Swartz MD    Office Visit    1 year ago Immunization due    [facility], [address]    Nurse Only    1 year ago Encounter for screening for malignant neoplasm of breast, unspecified screening modality    [facility], Arnie Stokes MD    Office Visit    2 years ago Controlled type 2 diabetes mellitus without complication, without long-term current use of insulin [facility] [place] [facility] [place] , [place], [place]    Office Visit    2 years ago Need for influenza vaccination    [facility], [address]    Nurse Only                 Failed - CMP or BMP in past 6 months     No results found for this or any previous visit (from the past 4392 hour(s)).               Failed - In person appointment or virtual visit in the past 6 months       Recent Outpatient Visits              1 year ago Pain in both knees, unspecified chronicity    Kaylynn Swartz MD    Office Visit    1 year ago Immunization due    [facility], [address]    Nurse Only    1 year ago Encounter for screening for malignant neoplasm of breast, unspecified screening modality    Kaylynn Swartz MD    Office Visit    2 years ago Controlled type 2 diabetes mellitus without complication, without long-term current use of insulin [facility])    [facility] for [address], [place]    Office Visit    2 years ago Need for influenza vaccination    [facility], [address]    Nurse Only                 Passed - Last BP reading less than 140/90     BP Readings from Last 1 Encounters:  06/16/21 : 120/79                Passed - EGFRCR or GFRNAA > 50     GFR Evaluation  GFRNAA: 60 , resulted on 12/21/2021                  Recent Outpatient Visits              1 year ago Pain in both knees, unspecified chronicity    Alesha Siddiqui MD    Office Visit    1 year ago Immunization due    [facility], [address]    Nurse Only    1 year ago Encounter for screening for malignant neoplasm of breast, unspecified screening modality    Alesha Siddiqui MD    Office Visit    2 years ago Controlled type 2 diabetes mellitus without complication, without long-term current use of insulin [facility])    [facility] for [address] [place]    Office Visit    2 years ago Need for influenza vaccination    [facility], [address]    Nurse Only

## 2022-09-30 RX ORDER — METFORMIN HYDROCHLORIDE 500 MG/1
TABLET, EXTENDED RELEASE ORAL
Qty: 90 TABLET | Refills: 0 | Status: SHIPPED | OUTPATIENT
Start: 2022-09-30

## 2022-10-06 ENCOUNTER — TELEPHONE (OUTPATIENT)
Dept: FAMILY MEDICINE CLINIC | Facility: CLINIC | Age: 67
End: 2022-10-06

## 2022-10-07 RX ORDER — ATORVASTATIN CALCIUM 20 MG/1
TABLET, FILM COATED ORAL
Qty: 30 TABLET | Refills: 0 | Status: SHIPPED | OUTPATIENT
Start: 2022-10-07

## 2022-10-07 NOTE — TELEPHONE ENCOUNTER
To reception staff, pls call pt for f/u appt. Pls send back to Triage when completed.    Thanks       Please review refill protocol failed/ no protocol  Requested Prescriptions   Pending Prescriptions Disp Refills    ATORVASTATIN 20 MG Oral Tab [Pharmacy Med Name: ATORVASTATIN 20 MG TABLET] 30 tablet 0     Sig: TAKE 1 TABLET BY MOUTH EVERYDAY AT BEDTIME        Cholesterol Medication Protocol Failed - 10/7/2022 11:32 AM        Failed - LDL in past 12 months        Failed - Last LDL < 130     Lab Results   Component Value Date    LDL 78 10/19/2020               Failed - In person appointment or virtual visit in the past 12 mos or appointment in next 3 mos       Recent Outpatient Visits              1 year ago Pain in both knees, unspecified chronicity    Marina Mirza MD    Office Visit    1 year ago Immunization due    [address] [place], [facility]    Nurse Only    1 year ago Encounter for screening for malignant neoplasm of breast, unspecified screening modality    [address], Rachel Cotton MD    Office Visit    2 years ago Controlled type 2 diabetes mellitus without complication, without long-term current use of insulin [facility] [place] [facility] for Norman [facility] [place], [place]    Office Visit    2 years ago Need for influenza vaccination    [address] [place], [place]    Nurse Only                 Passed - ALT in past 12 months        Passed - Last ALT < 80       Lab Results   Component Value Date    ALT 21 12/21/2021

## 2022-10-25 ENCOUNTER — OFFICE VISIT (OUTPATIENT)
Dept: FAMILY MEDICINE CLINIC | Facility: CLINIC | Age: 67
End: 2022-10-25
Payer: MEDICARE

## 2022-10-25 VITALS
DIASTOLIC BLOOD PRESSURE: 79 MMHG | BODY MASS INDEX: 59.07 KG/M2 | HEIGHT: 59 IN | HEART RATE: 83 BPM | SYSTOLIC BLOOD PRESSURE: 118 MMHG | WEIGHT: 293 LBS | TEMPERATURE: 98 F

## 2022-10-25 DIAGNOSIS — Z12.31 ENCOUNTER FOR SCREENING MAMMOGRAM FOR MALIGNANT NEOPLASM OF BREAST: ICD-10-CM

## 2022-10-25 DIAGNOSIS — Z12.11 SCREENING FOR COLON CANCER: ICD-10-CM

## 2022-10-25 DIAGNOSIS — Z00.00 ENCOUNTER FOR ANNUAL HEALTH EXAMINATION: ICD-10-CM

## 2022-10-25 DIAGNOSIS — N18.9 ACUTE ON CHRONIC RENAL INSUFFICIENCY: Primary | ICD-10-CM

## 2022-10-25 DIAGNOSIS — L98.9 SKIN LESION: ICD-10-CM

## 2022-10-25 DIAGNOSIS — M81.0 OSTEOPOROSIS, UNSPECIFIED OSTEOPOROSIS TYPE, UNSPECIFIED PATHOLOGICAL FRACTURE PRESENCE: ICD-10-CM

## 2022-10-25 DIAGNOSIS — I10 HTN (HYPERTENSION), BENIGN: ICD-10-CM

## 2022-10-25 DIAGNOSIS — E66.01 MORBID OBESITY WITH BMI OF 60.0-69.9, ADULT (HCC): ICD-10-CM

## 2022-10-25 DIAGNOSIS — E11.9 DIABETES MELLITUS TYPE 2 WITHOUT RETINOPATHY (HCC): ICD-10-CM

## 2022-10-25 DIAGNOSIS — N28.9 ACUTE ON CHRONIC RENAL INSUFFICIENCY: Primary | ICD-10-CM

## 2022-10-25 PROBLEM — E11.22 TYPE 2 DIABETES MELLITUS WITH DIABETIC CHRONIC KIDNEY DISEASE (HCC): Status: ACTIVE | Noted: 2022-10-25

## 2022-10-25 LAB
CARTRIDGE LOT#: ABNORMAL NUMERIC
HEMOGLOBIN A1C: 6.1 % (ref 4.3–5.6)

## 2022-11-01 RX ORDER — FUROSEMIDE 20 MG/1
20 TABLET ORAL DAILY
Qty: 15 TABLET | Refills: 0 | Status: SHIPPED | OUTPATIENT
Start: 2022-11-01

## 2022-11-02 RX ORDER — ZOLPIDEM TARTRATE 10 MG/1
TABLET ORAL
Qty: 30 TABLET | Refills: 1 | Status: SHIPPED | OUTPATIENT
Start: 2022-11-02

## 2022-11-02 NOTE — TELEPHONE ENCOUNTER
Please review. Protocol failed / No protocol.     Requested Prescriptions   Pending Prescriptions Disp Refills    zolpidem 10 MG Oral Tab 30 tablet 1     Sig: TAKE 1 TABLET BY MOUTH EVERYDAY AT BEDTIME       There is no refill protocol information for this order          Recent Outpatient Visits              1 week ago Acute on chronic renal insufficiency    Josie Salgado MD    Office Visit    1 year ago Pain in both knees, unspecified chronicity    Josie Salgado MD    Office Visit    1 year ago Immunization due    [facility], [address] [place], [address]    Nurse Only    2 years ago Encounter for screening for malignant neoplasm of breast, unspecified screening modality    Josie Salgado MD    Office Visit    2 years ago Controlled type 2 diabetes mellitus without complication, without long-term current use of insulin [facility])    [facility] for [place]    Office Visit

## 2022-11-14 NOTE — TELEPHONE ENCOUNTER
Spoke to pt. Will get labs drawn this week.  Pt is requesting 90 day RX once results received and reviewed since she will be leaving to [place] soon

## 2022-11-15 RX ORDER — ATORVASTATIN CALCIUM 20 MG/1
TABLET, FILM COATED ORAL
Qty: 30 TABLET | Refills: 0 | Status: SHIPPED | OUTPATIENT
Start: 2022-11-15

## 2022-11-18 ENCOUNTER — LAB ENCOUNTER (OUTPATIENT)
Dept: LAB | Age: 67
End: 2022-11-18
Attending: FAMILY MEDICINE
Payer: MEDICARE

## 2022-11-18 DIAGNOSIS — E11.9 DIABETES MELLITUS TYPE 2 WITHOUT RETINOPATHY (HCC): ICD-10-CM

## 2022-11-18 LAB
ALBUMIN SERPL-MCNC: 3.8 G/DL (ref 3.4–5)
ALBUMIN/GLOB SERPL: 1.1 {RATIO} (ref 1–2)
ALP LIVER SERPL-CCNC: 103 U/L
ALT SERPL-CCNC: 20 U/L
ANION GAP SERPL CALC-SCNC: 8 MMOL/L (ref 0–18)
AST SERPL-CCNC: 15 U/L (ref 15–37)
BASOPHILS # BLD AUTO: 0.07 X10(3) UL (ref 0–0.2)
BASOPHILS NFR BLD AUTO: 1 %
BILIRUB SERPL-MCNC: 0.7 MG/DL (ref 0.1–2)
BUN BLD-MCNC: 21 MG/DL (ref 7–18)
BUN/CREAT SERPL: 21.6 (ref 10–20)
CALCIUM BLD-MCNC: 9 MG/DL (ref 8.5–10.1)
CHLORIDE SERPL-SCNC: 106 MMOL/L (ref 98–112)
CHOLEST SERPL-MCNC: 177 MG/DL (ref ?–200)
CO2 SERPL-SCNC: 29 MMOL/L (ref 21–32)
CREAT BLD-MCNC: 0.97 MG/DL
DEPRECATED RDW RBC AUTO: 44.4 FL (ref 35.1–46.3)
EOSINOPHIL # BLD AUTO: 0.12 X10(3) UL (ref 0–0.7)
EOSINOPHIL NFR BLD AUTO: 1.7 %
ERYTHROCYTE [DISTWIDTH] IN BLOOD BY AUTOMATED COUNT: 13 % (ref 11–15)
FASTING PATIENT LIPID ANSWER: YES
FASTING STATUS PATIENT QL REPORTED: YES
GFR SERPLBLD BASED ON 1.73 SQ M-ARVRAT: 64 ML/MIN/1.73M2 (ref 60–?)
GLOBULIN PLAS-MCNC: 3.5 G/DL (ref 2.8–4.4)
GLUCOSE BLD-MCNC: 101 MG/DL (ref 70–99)
HCT VFR BLD AUTO: 47.5 %
HDLC SERPL-MCNC: 61 MG/DL (ref 40–59)
HGB BLD-MCNC: 15.1 G/DL
IMM GRANULOCYTES # BLD AUTO: 0.01 X10(3) UL (ref 0–1)
IMM GRANULOCYTES NFR BLD: 0.1 %
LDLC SERPL CALC-MCNC: 97 MG/DL (ref ?–100)
LYMPHOCYTES # BLD AUTO: 2.82 X10(3) UL (ref 1–4)
LYMPHOCYTES NFR BLD AUTO: 40.4 %
MCH RBC QN AUTO: 29.7 PG (ref 26–34)
MCHC RBC AUTO-ENTMCNC: 31.8 G/DL (ref 31–37)
MCV RBC AUTO: 93.3 FL
MONOCYTES # BLD AUTO: 0.58 X10(3) UL (ref 0.1–1)
MONOCYTES NFR BLD AUTO: 8.3 %
NEUTROPHILS # BLD AUTO: 3.38 X10 (3) UL (ref 1.5–7.7)
NEUTROPHILS # BLD AUTO: 3.38 X10(3) UL (ref 1.5–7.7)
NEUTROPHILS NFR BLD AUTO: 48.5 %
NONHDLC SERPL-MCNC: 116 MG/DL (ref ?–130)
OSMOLALITY SERPL CALC.SUM OF ELEC: 299 MOSM/KG (ref 275–295)
PLATELET # BLD AUTO: 222 10(3)UL (ref 150–450)
POTASSIUM SERPL-SCNC: 4.7 MMOL/L (ref 3.5–5.1)
PROT SERPL-MCNC: 7.3 G/DL (ref 6.4–8.2)
RBC # BLD AUTO: 5.09 X10(6)UL
SODIUM SERPL-SCNC: 143 MMOL/L (ref 136–145)
TRIGL SERPL-MCNC: 108 MG/DL (ref 30–149)
TSI SER-ACNC: 2.27 MIU/ML (ref 0.36–3.74)
VLDLC SERPL CALC-MCNC: 18 MG/DL (ref 0–30)
WBC # BLD AUTO: 7 X10(3) UL (ref 4–11)

## 2022-11-18 PROCEDURE — 80061 LIPID PANEL: CPT

## 2022-11-18 PROCEDURE — 36415 COLL VENOUS BLD VENIPUNCTURE: CPT

## 2022-11-18 PROCEDURE — 80053 COMPREHEN METABOLIC PANEL: CPT

## 2022-11-18 PROCEDURE — 84443 ASSAY THYROID STIM HORMONE: CPT

## 2022-11-18 PROCEDURE — 85025 COMPLETE CBC W/AUTO DIFF WBC: CPT

## 2022-11-19 RX ORDER — FUROSEMIDE 20 MG/1
20 TABLET ORAL DAILY
Qty: 15 TABLET | Refills: 0 | Status: SHIPPED | OUTPATIENT
Start: 2022-11-19

## 2022-12-04 RX ORDER — FUROSEMIDE 20 MG/1
20 TABLET ORAL DAILY
Qty: 15 TABLET | Refills: 0 | Status: SHIPPED | OUTPATIENT
Start: 2022-12-04

## 2022-12-04 NOTE — TELEPHONE ENCOUNTER
Refill passed per [employer] protocol. Requested Prescriptions   Pending Prescriptions Disp Refills    FUROSEMIDE 20 MG Oral Tab [Pharmacy Med Name: FUROSEMIDE 20 MG TABLET] 15 tablet 0     Sig: TAKE 1 TABLET (20 MG TOTAL) BY MOUTH DAILY.  ON BEHALF OF  [address]       Hypertensive Medications Protocol Passed - 12/4/2022 11:32 AM        Passed - In person appointment in the past 12 or next 3 months     Recent Outpatient Visits              1 month ago Acute on chronic renal insufficiency    Michael Sims MD    Office Visit    1 year ago Pain in both knees, unspecified chronicity    Diego Ko MD    Office Visit    1 year ago Immunization due    [employer], [place], [facility]    Nurse Only    2 years ago Encounter for screening for malignant neoplasm of breast, unspecified screening modality    [employer], [place], Beverley Waters MD    Office Visit    3 years ago Controlled type 2 diabetes mellitus without complication, without long-term current use of insulin [facility])    [facility] for Kelby Turk [address] [place] [address]    Office Visit                      Passed - Last BP reading less than 140/90     BP Readings from Last 1 Encounters:  10/25/22 : 118/79              Passed - CMP or BMP in past 6 months     Recent Results (from the past 4392 hour(s))   COMP METABOLIC PANEL (14)    Collection Time: 11/18/22 11:13 AM   Result Value Ref Range    Glucose 101 (H) 70 - 99 mg/dL    Sodium 143 136 - 145 mmol/L    Potassium 4.7 3.5 - 5.1 mmol/L    Chloride 106 98 - 112 mmol/L    CO2 29.0 21.0 - 32.0 mmol/L    Anion Gap 8 0 - 18 mmol/L    BUN 21 (H) 7 - 18 mg/dL    Creatinine 0.97 0.55 - 1.02 mg/dL    BUN/CREA Ratio 21.6 (H) 10.0 - 20.0    Calcium, Total 9.0 8.5 - 10.1 mg/dL    Calculated Osmolality 299 (H) 275 - 295 mOsm/kg    eGFR-Cr 64 >=60 mL/min/1.73m2    ALT 20 13 - 56 U/L    AST 15 15 - 37 U/L    Alkaline Phosphatase 103 55 - 142 U/L    Bilirubin, Total 0.7 0.1 - 2.0 mg/dL    Total Protein 7.3 6.4 - 8.2 g/dL    Albumin 3.8 3.4 - 5.0 g/dL    Globulin  3.5 2.8 - 4.4 g/dL    A/G Ratio 1.1 1.0 - 2.0    Patient Fasting for CMP? Yes      *Note: Due to a large number of results and/or encounters for the requested time period, some results have not been displayed. A complete set of results can be found in Results Review.                Passed - In person appointment or virtual visit in the past 6 months     Recent Outpatient Visits              1 month ago Acute on chronic renal insufficiency    Michael Love MD    Office Visit    1 year ago Pain in both knees, unspecified chronicity    Deborah Cavanaugh MD    Office Visit    1 year ago [facility]ization [facility]    [facility], [address] [facility]    Nurse Only    2 years ago Encounter for screening for malignant neoplasm of breast, unspecified screening modality    [employer] [facility], [address] [facility]    Office Visit    3 years ago Controlled type 2 diabetes mellitus without complication, without long-term current use of insulin [facility])    [place] [facility] [place] [facility] for [address] Visit                      Passed - [facility] or GFRNAA > 50     GFR Evaluation  EGFRCR: 64 , resulted on 11/18/2022                   Recent Outpatient Visits              1 month ago Acute on chronic renal insufficiency    Deborah Cavanaugh MD    Office Visit    1 year ago Pain in both knees, unspecified chronicity    Deborah Cavanaugh MD    Office Visit    1 year ago Immunization due    [place] [employer] [facility], [address] [facility]    Nurse Only    2 years ago Encounter for screening for malignant neoplasm of breast, unspecified screening modality    Izaiah Canseco MD    Office Visit    3 years ago Controlled type 2 diabetes mellitus without complication, without long-term current use of insulin [facility])    [place] [facility] [facility] for [place]    Office Visit

## 2022-12-09 RX ORDER — ATORVASTATIN CALCIUM 20 MG/1
TABLET, FILM COATED ORAL
Qty: 90 TABLET | Refills: 1 | Status: SHIPPED | OUTPATIENT
Start: 2022-12-09

## 2022-12-09 NOTE — TELEPHONE ENCOUNTER
Refill passed per [facility] protocol.      Requested Prescriptions   Pending Prescriptions Disp Refills    ATORVASTATIN 20 MG Oral Tab [Pharmacy Med Name: ATORVASTATIN 20 MG TABLET] 30 tablet 0     Sig: TAKE 1 TABLET BY MOUTH EVERYDAY AT BEDTIME       Cholesterol Medication Protocol Passed - 12/9/2022 12:32 PM        Passed - ALT in past 12 months        Passed - LDL in past 12 months        Passed - Last ALT < 80     Lab Results   Component Value Date    ALT 20 11/18/2022             Passed - Last LDL < 130     Lab Results   Component Value Date    LDL 97 11/18/2022             Passed - In person appointment or virtual visit in the past 12 mos or appointment in next 3 mos     Recent Outpatient Visits              1 month ago Acute on chronic renal insufficiency    Debbie Gaytan MD    Office Visit    1 year ago Pain in both knees, unspecified chronicity    Debbie Gaytan MD    Office Visit    1 year ago Immunization due    [facility], [address]    Nurse Only    2 years ago Encounter for screening for malignant neoplasm of breast, unspecified screening modality    [facility], [address] Jaycee Adams MD    Office Visit    3 years ago Controlled type 2 diabetes mellitus without complication, without long-term current use of insulin [facility] [place] [facility] for [address] Visit                                 Recent Outpatient Visits              1 month ago Acute on chronic renal insufficiency    Debbie Gaytan MD    Office Visit    1 year ago Pain in both knees, unspecified chronicity    Debbie Gaytan MD    Office Visit    1 year ago Immunization due    [facility], [address]    Nurse Only    2 years ago Encounter for screening for malignant neoplasm of breast, unspecified screening modality    Marina Mirza MD    Office Visit    3 years ago Controlled type 2 diabetes mellitus without complication, without long-term current use of insulin [facility])    [employer] for MEJIA [place]    Office Visit

## 2022-12-19 RX ORDER — FUROSEMIDE 20 MG/1
20 TABLET ORAL DAILY
Qty: 90 TABLET | Refills: 0 | Status: SHIPPED | OUTPATIENT
Start: 2022-12-19

## 2022-12-19 NOTE — TELEPHONE ENCOUNTER
Previously filled for quantity of 15, last office note of 10/25 states to continue same meds. Dr. Dayo Rowe, will patient continue on this medication? OK for 90 day supply? Refill passed per [facility] protocol. Requested Prescriptions   Pending Prescriptions Disp Refills    FUROSEMIDE 20 MG Oral Tab [Pharmacy Med Name: FUROSEMIDE 20 MG TABLET] 15 tablet 0     Sig: TAKE 1 TABLET (20 MG TOTAL) BY MOUTH DAILY.  ON BEHALF OF  [address],[facility]       Hypertensive Medications Protocol Passed - 12/19/2022  1:30 PM        Passed - In person appointment in the past 12 or next 3 months     Recent Outpatient Visits              1 month ago Acute on chronic renal insufficiency    Jarad Galvan MD    Office Visit    1 year ago Pain in both knees, unspecified chronicity    Jarad Galvan MD    Office Visit    1 year ago Immunization due    [facility], [employer]    Nurse Only    2 years ago Encounter for screening for malignant neoplasm of breast, unspecified screening modality    [facility], [employer] Arthur Wakefield MD    Office Visit    3 years ago Controlled type 2 diabetes mellitus without complication, without long-term current use of insulin [facility] [place] [facility] for Kelby [address] Visit                      Passed - Last BP reading less than 140/90     BP Readings from Last 1 Encounters:  10/25/22 : 118/79              Passed - CMP or BMP in past 6 months     Recent Results (from the past 4392 hour(s))   COMP METABOLIC PANEL (14)    Collection Time: 11/18/22 11:13 AM   Result Value Ref Range    Glucose 101 (H) 70 - 99 mg/dL    Sodium 143 136 - 145 mmol/L    Potassium 4.7 3.5 - 5.1 mmol/L    Chloride 106 98 - 112 mmol/L    CO2 29.0 21.0 - 32.0 mmol/L    Anion Gap 8 0 - 18 mmol/L    BUN 21 (H) 7 - 18 mg/dL    Creatinine 0.97 0.55 - 1.02 mg/dL    BUN/CREA Ratio 21.6 (H) 10.0 - 20.0    Calcium, Total 9.0 8.5 - 10.1 mg/dL    Calculated Osmolality 299 (H) 275 - 295 mOsm/kg    eGFR-Cr 64 >=60 mL/min/1.73m2    ALT 20 13 - 56 U/L    AST 15 15 - 37 U/L    Alkaline Phosphatase 103 55 - 142 U/L    Bilirubin, Total 0.7 0.1 - 2.0 mg/dL    Total Protein 7.3 6.4 - 8.2 g/dL    Albumin 3.8 3.4 - 5.0 g/dL    Globulin  3.5 2.8 - 4.4 g/dL    A/G Ratio 1.1 1.0 - 2.0    Patient Fasting for CMP? Yes      *Note: Due to a large number of results and/or encounters for the requested time period, some results have not been displayed. A complete set of results can be found in Results Review.                Passed - In person appointment or virtual visit in the past 6 months     Recent Outpatient Visits              1 month ago Acute on chronic renal insufficiency    Michael García MD    Office Visit    1 year ago Pain in both knees, unspecified chronicity    Tej Loya MD    Office Visit    1 year ago Immunization due    [address], [place], [address]    Nurse Only    2 years ago Encounter for screening for malignant neoplasm of breast, unspecified screening modality    Tej Loya MD    Office Visit    3 years ago Controlled type 2 diabetes mellitus without complication, without long-term current use of insulin [facility] [place] [facility] for Csamirey[address] Visit                      Passed - [facility] or GFRNAA > 50     GFR Evaluation  EGFRCR: 64 , resulted on 11/18/2022              Recent Outpatient Visits              1 month ago Acute on chronic renal insufficiency    Tej Loya MD    Office Visit    1 year ago Pain in both knees, unspecified chronicity    Tej Loya MD    Office Visit    1 year ago Immunization due    [address], [address] [place], [address]    Nurse Only    2 years ago Encounter for screening for malignant neoplasm of breast, unspecified screening modality    Wesly Moore MD    Office Visit    3 years ago Controlled type 2 diabetes mellitus without complication, without long-term current use of insulin [facility])    [place] [facility] [place] [facility] for [place] , Pérez [place]    Office Visit

## 2023-03-03 RX ORDER — ZOLPIDEM TARTRATE 10 MG/1
TABLET ORAL
Qty: 30 TABLET | Refills: 1 | Status: SHIPPED | OUTPATIENT
Start: 2023-03-03

## 2023-03-03 NOTE — TELEPHONE ENCOUNTER
Please Review. Protocol Failed or has no protocol.        Requested Prescriptions   Pending Prescriptions Disp Refills    zolpidem 10 MG Oral Tab 30 tablet 1     Sig: TAKE 1 TABLET BY MOUTH EVERYDAY AT BEDTIME       There is no refill protocol information for this order        Recent Outpatient Visits              4 months ago Acute on chronic renal insufficiency    Carol Proctor MD    Office Visit    1 year ago Pain in both knees, unspecified chronicity    Carol Proctor MD    Office Visit    1 year ago Immunization due    [facility] [place]    Nurse Only    2 years ago Encounter for screening for malignant neoplasm of breast, unspecified screening modality    Carol Proctor MD    Office Visit    3 years ago Controlled type 2 diabetes mellitus without complication, without long-term current use of insulin [facility])    [address] [facility], [place]    Office Visit

## 2023-03-04 NOTE — TELEPHONE ENCOUNTER
MyChart message sent for clarification if she is taking the furosemide 20 mg daily /routinely. Please update SIG.and follow protocol.

## 2023-03-07 RX ORDER — FUROSEMIDE 20 MG/1
20 TABLET ORAL DAILY
Qty: 90 TABLET | Refills: 0 | Status: SHIPPED | OUTPATIENT
Start: 2023-03-07

## 2023-04-24 NOTE — TELEPHONE ENCOUNTER
Dr Noelle Rai, pt is scheduled for nurse visit today for Shingles #2. First dose and LOV with you 10/23/2020. Please advise. Thank you.
difficulty breathing

## 2023-05-22 RX ORDER — ATORVASTATIN CALCIUM 20 MG/1
TABLET, FILM COATED ORAL
Qty: 90 TABLET | Refills: 3 | Status: SHIPPED | OUTPATIENT
Start: 2023-05-22

## 2023-05-22 NOTE — TELEPHONE ENCOUNTER
Refill passed per [place] [employer] protocol.     Requested Prescriptions   Pending Prescriptions Disp Refills    ATORVASTATIN 20 MG Oral Tab [Pharmacy Med Name: ATORVASTATIN 20 MG TABLET] 90 tablet 1     Sig: TAKE 1 TABLET BY MOUTH EVERYDAY AT BEDTIME       Cholesterol Medication Protocol Passed - 5/22/2023 12:23 AM        Passed - ALT in past 12 months        Passed - LDL in past 12 months        Passed - Last ALT < 80     Lab Results   Component Value Date    ALT 20 11/18/2022             Passed - Last LDL < 130     Lab Results   Component Value Date    LDL 97 11/18/2022               Passed - In person appointment or virtual visit in the past 12 mos or appointment in next 3 mos     Recent Outpatient Visits              6 months ago Acute on chronic renal insufficiency    Georgina Wood MD    Office Visit    1 year ago Pain in both knees, unspecified chronicity    Georgina Wood MD    Office Visit    2 years ago Immunization due    [address], [address] [facility]    Nurse Only    2 years ago Encounter for screening for malignant neoplasm of breast, unspecified screening modality    Georgina Wood MD    Office Visit    3 years ago Controlled type 2 diabetes mellitus without complication, without long-term current use of insulin ([facility] [address]    Michael Rasmussen [place], [address] Visit                         Recent Outpatient Visits              6 months ago Acute on chronic renal insufficiency    Georgina Wood MD    Office Visit    1 year ago Pain in both knees, unspecified chronicity    Georgina Wood MD    Office Visit    2 years ago Immunization due    [facility], Michael Brown    Nurse Only    2 years ago Encounter for screening for malignant neoplasm of breast, unspecified screening modality    Kaylen Martinez MD    Office Visit    3 years ago Controlled type 2 diabetes mellitus without complication, without long-term current use of insulin ([facility]    [address], [address] [facility],[address], [facility] [place], Pérez [place]    Office Visit

## 2023-06-12 RX ORDER — METFORMIN HYDROCHLORIDE 500 MG/1
500 TABLET, EXTENDED RELEASE ORAL
Qty: 90 TABLET | Refills: 0 | Status: SHIPPED | OUTPATIENT
Start: 2023-06-12

## 2023-06-12 NOTE — TELEPHONE ENCOUNTER
Please review. Protocol failed / Has no protocol. [employer] message sent to patient to schedule an office visit with PCP.       Requested Prescriptions   Pending Prescriptions Disp Refills    METFORMIN  MG Oral Tablet 24 Hr [Pharmacy Med Name: METFORMIN HCL  MG TABLET] 90 tablet 1     Sig: TAKE 1 TABLET BY MOUTH EVERY DAY WITH BREAKFAST       Diabetes Medication Protocol Failed - 6/12/2023 12:03 AM        Failed - Last A1C < 7.5 and within past 6 months     Lab Results   Component Value Date    A1C 6.1 (A) 10/25/2022               Failed - In person appointment or virtual visit in the past 6 mos or appointment in next 3 mos     Recent Outpatient Visits              7 months ago Acute on chronic renal insufficiency    Abdiel Morillo MD    Office Visit    1 year ago Pain in both knees, unspecified chronicity    Abdiel Morillo MD    Office Visit    2 years ago Immunization due    [address], [address] [facility]    Nurse Only    2 years ago Encounter for screening for malignant neoplasm of breast, unspecified screening modality    Abdiel Morillo MD    Office Visit    3 years ago Controlled type 2 diabetes mellitus without complication, without long-term current use of insulin ([facility] [address]    [facility], [facility], [place], [place]    Office Visit                      Passed - EGFRCR or GFRNAA > 50     GFR Evaluation  EGFRCR: 64 , resulted on 11/18/2022            Passed - GFR in the past 12 months              Recent Outpatient Visits              7 months ago Acute on chronic renal insufficiency    Abdiel Morillo MD    Office Visit    1 year ago Pain in both knees, unspecified chronicity    [facility] Aletha Arellano MD    Office Visit    2 years ago Immunization due    [address], [address] [place]    Nurse Only    2 years ago Encounter for screening for malignant neoplasm of breast, unspecified screening modality    Steve Torres MD    Office Visit    3 years ago Controlled type 2 diabetes mellitus without complication, without long-term current use of insulin [facility])    [address], [address], [place]JIA [place]    Office Visit

## 2023-07-07 NOTE — TELEPHONE ENCOUNTER
Please review. Protocol failed / Has no protocol. [employer] message sent to patient to schedule an office visit with PCP. Will also make a phone attempt. Requested Prescriptions   Pending Prescriptions Disp Refills    FUROSEMIDE 20 MG Oral Tab [Pharmacy Med Name: FUROSEMIDE 20 MG TABLET] 30 tablet 0     Sig: Take 1 tablet (20 mg total) by mouth daily. On behalf of Dr Samanta Dunn schedule appointment       Hypertensive Medications Protocol Failed - 7/7/2023 10:31 AM        Failed - CMP or BMP in past 6 months     No results found for this or any previous visit (from the past 4392 hour(s)).             Failed - In person appointment or virtual visit in the past 6 months     Recent Outpatient Visits              8 months ago Acute on chronic renal insufficiency    Jaqueline Alvarez MD    Office Visit    2 years ago Pain in both knees, unspecified chronicity    Jaqueline Alvarez MD    Office Visit    2 years ago Immunization due    [address], [facility], [place]    Nurse Only    2 years ago Encounter for screening for malignant neoplasm of breast, unspecified screening modality    Jaqueline Alvarez MD    Office Visit    3 years ago Controlled type 2 diabetes mellitus without complication, without long-term current use of insulin ([facility]    [address], [address], [place]JIA [place], [address] - In person appointment in the past 12 or next 3 months     Recent Outpatient Visits              8 months ago Acute on chronic renal insufficiency    Jaqueline Alvarez MD    Office Visit    2 years ago Pain in both knees, unspecified chronicity    Jaqueline Alvarez MD    Office Visit 2 years ago Immunization due    [address], [address]    Nurse Only    2 years ago Encounter for screening for malignant neoplasm of breast, unspecified screening modality    Adelso Reich MD    Office Visit    3 years ago Controlled type 2 diabetes mellitus without complication, without long-term current use of insulin ([facility] [address]    [address], [address], [address], [place], [place]    Office Visit                      Passed - Last BP reading less than 140/90     BP Readings from Last 1 Encounters:  10/25/22 : 118/79              Passed - EGFRCR or GFRNAA > 50     GFR Evaluation  EGFRCR: 64 , resulted on 11/18/2022                Recent Outpatient Visits              8 months ago Acute on chronic renal insufficiency    [facility], Fazal Cuenca MD    Office Visit    2 years ago Pain in both knees, unspecified chronicity    Adelso Reich MD    Office Visit    2 years ago Immunization due    [address], [address] [place], [place]    Nurse Only    2 years ago Encounter for screening for malignant neoplasm of breast, unspecified screening modality    Adelso Reich MD    Office Visit    3 years ago Controlled type 2 diabetes mellitus without complication, without long-term current use of insulin [facility])    [address], [address], [address], [place], [place]    Office Visit

## 2023-07-08 RX ORDER — FUROSEMIDE 20 MG/1
20 TABLET ORAL DAILY
Qty: 30 TABLET | Refills: 0 | Status: SHIPPED | OUTPATIENT
Start: 2023-07-08

## 2023-07-25 ENCOUNTER — OFFICE VISIT (OUTPATIENT)
Dept: FAMILY MEDICINE CLINIC | Facility: CLINIC | Age: 68
End: 2023-07-25

## 2023-07-25 VITALS
HEART RATE: 80 BPM | WEIGHT: 293 LBS | DIASTOLIC BLOOD PRESSURE: 84 MMHG | OXYGEN SATURATION: 96 % | HEIGHT: 59 IN | SYSTOLIC BLOOD PRESSURE: 132 MMHG | RESPIRATION RATE: 16 BRPM | BODY MASS INDEX: 59.07 KG/M2

## 2023-07-25 DIAGNOSIS — Z00.00 ENCOUNTER FOR ANNUAL HEALTH EXAMINATION: ICD-10-CM

## 2023-07-25 DIAGNOSIS — E66.9 DIABETES MELLITUS TYPE 2 IN OBESE: Primary | ICD-10-CM

## 2023-07-25 DIAGNOSIS — E11.69 DIABETES MELLITUS TYPE 2 IN OBESE: Primary | ICD-10-CM

## 2023-07-25 LAB
CARTRIDGE LOT#: ABNORMAL NUMERIC
CREAT UR-SCNC: 50.1 MG/DL
HEMOGLOBIN A1C: 6 % (ref 4.3–5.6)
MICROALBUMIN UR-MCNC: 3.92 MG/DL
MICROALBUMIN/CREAT 24H UR-RTO: 78.2 UG/MG (ref ?–30)

## 2023-07-25 PROCEDURE — 3079F DIAST BP 80-89 MM HG: CPT | Performed by: FAMILY MEDICINE

## 2023-07-25 PROCEDURE — 3060F POS MICROALBUMINURIA REV: CPT | Performed by: FAMILY MEDICINE

## 2023-07-25 PROCEDURE — 83036 HEMOGLOBIN GLYCOSYLATED A1C: CPT | Performed by: FAMILY MEDICINE

## 2023-07-25 PROCEDURE — G0439 PPPS, SUBSEQ VISIT: HCPCS | Performed by: FAMILY MEDICINE

## 2023-07-25 PROCEDURE — 3061F NEG MICROALBUMINURIA REV: CPT | Performed by: FAMILY MEDICINE

## 2023-07-25 PROCEDURE — 1159F MED LIST DOCD IN RCRD: CPT | Performed by: FAMILY MEDICINE

## 2023-07-25 PROCEDURE — 1160F RVW MEDS BY RX/DR IN RCRD: CPT | Performed by: FAMILY MEDICINE

## 2023-07-25 PROCEDURE — 3008F BODY MASS INDEX DOCD: CPT | Performed by: FAMILY MEDICINE

## 2023-07-25 PROCEDURE — 1170F FXNL STATUS ASSESSED: CPT | Performed by: FAMILY MEDICINE

## 2023-07-25 PROCEDURE — 96160 PT-FOCUSED HLTH RISK ASSMT: CPT | Performed by: FAMILY MEDICINE

## 2023-07-25 PROCEDURE — 99213 OFFICE O/P EST LOW 20 MIN: CPT | Performed by: FAMILY MEDICINE

## 2023-07-25 PROCEDURE — 3044F HG A1C LEVEL LT 7.0%: CPT | Performed by: FAMILY MEDICINE

## 2023-07-25 PROCEDURE — 1125F AMNT PAIN NOTED PAIN PRSNT: CPT | Performed by: FAMILY MEDICINE

## 2023-07-25 PROCEDURE — 3075F SYST BP GE 130 - 139MM HG: CPT | Performed by: FAMILY MEDICINE

## 2023-07-25 RX ORDER — ATORVASTATIN CALCIUM 20 MG/1
TABLET, FILM COATED ORAL
Qty: 90 TABLET | Refills: 3 | Status: SHIPPED | OUTPATIENT
Start: 2023-07-25

## 2023-07-25 RX ORDER — FUROSEMIDE 20 MG/1
20 TABLET ORAL DAILY
Qty: 90 TABLET | Refills: 0 | Status: SHIPPED | OUTPATIENT
Start: 2023-07-25

## 2023-07-25 RX ORDER — ZOLPIDEM TARTRATE 10 MG/1
TABLET ORAL
Qty: 30 TABLET | Refills: 1 | Status: SHIPPED | OUTPATIENT
Start: 2023-07-25

## 2023-07-25 RX ORDER — METFORMIN HYDROCHLORIDE 500 MG/1
500 TABLET, EXTENDED RELEASE ORAL
Qty: 90 TABLET | Refills: 1 | Status: SHIPPED | OUTPATIENT
Start: 2023-07-25

## 2023-08-05 ENCOUNTER — LAB ENCOUNTER (OUTPATIENT)
Dept: LAB | Age: 68
End: 2023-08-05
Attending: FAMILY MEDICINE
Payer: MEDICARE

## 2023-08-05 DIAGNOSIS — E11.69 DIABETES MELLITUS TYPE 2 IN OBESE: ICD-10-CM

## 2023-08-05 DIAGNOSIS — E66.9 DIABETES MELLITUS TYPE 2 IN OBESE: ICD-10-CM

## 2023-08-05 LAB
ALBUMIN SERPL-MCNC: 3.8 G/DL (ref 3.4–5)
ALBUMIN/GLOB SERPL: 1 {RATIO} (ref 1–2)
ALP LIVER SERPL-CCNC: 97 U/L
ALT SERPL-CCNC: 23 U/L
ANION GAP SERPL CALC-SCNC: 5 MMOL/L (ref 0–18)
AST SERPL-CCNC: 25 U/L (ref 15–37)
BILIRUB SERPL-MCNC: 0.8 MG/DL (ref 0.1–2)
BUN BLD-MCNC: 14 MG/DL (ref 7–18)
CALCIUM BLD-MCNC: 9.5 MG/DL (ref 8.5–10.1)
CHLORIDE SERPL-SCNC: 105 MMOL/L (ref 98–112)
CO2 SERPL-SCNC: 27 MMOL/L (ref 21–32)
CREAT BLD-MCNC: 1.32 MG/DL
EGFRCR SERPLBLD CKD-EPI 2021: 44 ML/MIN/1.73M2 (ref 60–?)
FASTING STATUS PATIENT QL REPORTED: YES
GLOBULIN PLAS-MCNC: 4 G/DL (ref 2.8–4.4)
GLUCOSE BLD-MCNC: 102 MG/DL (ref 70–99)
OSMOLALITY SERPL CALC.SUM OF ELEC: 285 MOSM/KG (ref 275–295)
POTASSIUM SERPL-SCNC: 4 MMOL/L (ref 3.5–5.1)
PROT SERPL-MCNC: 7.8 G/DL (ref 6.4–8.2)
SODIUM SERPL-SCNC: 137 MMOL/L (ref 136–145)

## 2023-08-05 PROCEDURE — 36415 COLL VENOUS BLD VENIPUNCTURE: CPT

## 2023-08-05 PROCEDURE — 80053 COMPREHEN METABOLIC PANEL: CPT

## 2023-08-24 ENCOUNTER — TELEPHONE (OUTPATIENT)
Facility: CLINIC | Age: 68
End: 2023-08-24

## 2023-08-24 DIAGNOSIS — Z86.010 PERSONAL HISTORY OF COLONIC POLYPS: Primary | ICD-10-CM

## 2023-08-25 RX ORDER — POLYETHYLENE GLYCOL 3350, SODIUM CHLORIDE, SODIUM BICARBONATE, POTASSIUM CHLORIDE 420; 11.2; 5.72; 1.48 G/4L; G/4L; G/4L; G/4L
4 POWDER, FOR SOLUTION ORAL ONCE
Qty: 4000 ML | Refills: 0 | Status: SHIPPED | OUTPATIENT
Start: 2023-08-25 | End: 2023-08-25

## 2023-10-09 NOTE — PROGRESS NOTES
HPI:    Joleen Austin is a 58year old female here today for hospital follow up.    She was discharged from Inpatient hospital[facility]  to Home   Admission Date: 7/20/18   Discharge Date: 7/22/18  Hospital Discharge Diagnoses (since 7/13/20 Meclizine HCl 12.5 MG Oral Tab Take 1 tablet (12.5 mg total) by mouth 3 (three) times daily as needed. No current facility-administered medications on file prior to visit.        HISTORY: reconciled and reviewed with patient  She  has a past medical h Right arm)   Pulse 72   Temp 96.8 °F (36 °C) (Temporal)   Ht 5' (1.524 m)   Wt (!) 331 lb (150.1 kg)   BMI 64.64 kg/m²    GENERAL: well developed, well nourished, in no apparent distress  SKIN: no rashes, no suspicious lesions  HEENT: atraumatic, normoceph 4-10 METS

## 2023-10-19 ENCOUNTER — LAB ENCOUNTER (OUTPATIENT)
Dept: LAB | Age: 68
End: 2023-10-19
Attending: FAMILY MEDICINE
Payer: MEDICARE

## 2023-10-19 DIAGNOSIS — N28.9 ABNORMAL KIDNEY FUNCTION: ICD-10-CM

## 2023-10-19 LAB
ANION GAP SERPL CALC-SCNC: 7 MMOL/L (ref 0–18)
BUN BLD-MCNC: 14 MG/DL (ref 7–18)
BUN/CREAT SERPL: 15.9 (ref 10–20)
CALCIUM BLD-MCNC: 9.2 MG/DL (ref 8.5–10.1)
CHLORIDE SERPL-SCNC: 107 MMOL/L (ref 98–112)
CO2 SERPL-SCNC: 27 MMOL/L (ref 21–32)
CREAT BLD-MCNC: 0.88 MG/DL
EGFRCR SERPLBLD CKD-EPI 2021: 71 ML/MIN/1.73M2 (ref 60–?)
FASTING STATUS PATIENT QL REPORTED: YES
GLUCOSE BLD-MCNC: 96 MG/DL (ref 70–99)
OSMOLALITY SERPL CALC.SUM OF ELEC: 292 MOSM/KG (ref 275–295)
POTASSIUM SERPL-SCNC: 4.2 MMOL/L (ref 3.5–5.1)
SODIUM SERPL-SCNC: 141 MMOL/L (ref 136–145)

## 2023-10-19 PROCEDURE — 36415 COLL VENOUS BLD VENIPUNCTURE: CPT

## 2023-10-19 PROCEDURE — 80048 BASIC METABOLIC PNL TOTAL CA: CPT

## 2023-10-20 ENCOUNTER — TELEPHONE (OUTPATIENT)
Dept: FAMILY MEDICINE CLINIC | Facility: CLINIC | Age: 68
End: 2023-10-20

## 2023-10-20 NOTE — TELEPHONE ENCOUNTER
Patient returned call, is scheduled for appointment with Dr. Dave Preston on 3/24/24 for her diabetic eye exam, did not know if appointment needed to be scheduled sooner.      Requesting a callback

## 2023-11-14 NOTE — DISCHARGE INSTRUCTIONS

## 2023-11-14 NOTE — OPERATIVE REPORT
[address] Endoscopy Report      Date of Procedure:  11/14/23      Preoperative Diagnosis:  Personal history of adenomatous colon polyps      Postoperative Diagnosis:  1. Colon polyps  2. Sigmoid colon diverticulosis      Procedure:    Colonoscopy with polypectomy      Surgeon:  Barrett De León M.D. Anesthesia:  Monitored anesthesia care  Cecal withdrawal time: 20 minutes  EBL:  Insignificant      Brief History: This is a 70year old female who presents for a surveillance colonoscopy in the setting of a history of #5 subcentimeter adenomatous polyps removed endoscopically 4-1/2 years prior. The patient has been asymptomatic from a lower gastrointestinal tract standpoint. Technique:  After informed consent, the patient was placed in the left lateral recumbent position. Digital rectal examination revealed no palpable intraluminal abnormalities. An Olympus variable stiffness 190 series HD colonoscope was inserted into the rectum and advanced under direct vision by following the lumen to the terminal ileum. The colon was examined upon withdrawal in the left lateral recumbent position. Findings:  The preparation of the colon was good. The terminal ileum was examined for 5 cm and visually normal.  The ileocecal valve was well preserved. The visualized colonic mucosa from the cecum to the anal verge was normal with an intact vascular pattern. There were #7 polyps seen within the colon which removed as follows:    1. In the cecum there were #3 3 mm sessile polyps which were removed with a cold biopsy forceps. 2.  In the transverse colon there were #4 3-4 mm sessile polyps which were cold snare excised and retrieved. Inspection of all sites revealed no evidence of ongoing bleeding. There were multiple diverticula seen in the sigmoid colon without current signs of complication.   There were no other colonic polyps, mass lesions, vascular anomalies or signs of inflammation seen.  Retroflexion in the rectum revealed no abnormalities. The procedure was well tolerated without immediate complication. Impression:  1. Diminutive colon polyps  2. Uncomplicated sigmoid colon diverticulosis    Recommendations:  1. High-fiber diet. 2.  Follow-up biopsy results. Polyp histology to determine recommendations regarding follow-up.         Katie Bains MD  11/14/2023

## 2023-11-14 NOTE — H&P
History & Physical Examination    Patient Name: Diane Noel  MRN: [id number]  [facility]: [id number]  YOB: 1955    Diagnosis: Personal history of adenomatous colon polyps      Medications Prior to Admission   Medication Sig Dispense Refill Last Dose    metFORMIN  MG Oral Tablet 24 Hr Take 1 tablet (500 mg total) by mouth daily with breakfast. 90 tablet 1 2023    furosemide 20 MG Oral Tab Take 1 tablet (20 mg total) by mouth daily. 90 tablet 0 2023    atorvastatin 20 MG Oral Tab TAKE 1 TABLET BY MOUTH EVERYDAY AT BEDTIME 90 tablet 3 2023    zolpidem 10 MG Oral Tab TAKE 1 TABLET BY MOUTH EVERYDAY AT BEDTIME (Patient taking differently: Take 0.5 tablets (5 mg total) by mouth nightly. TAKE 1 TABLET BY MOUTH EVERYDAY AT BEDTIME) 30 tablet 1     Acetaminophen (TYLENOL ARTHRITIS PAIN OR) Take 2 tablets by mouth daily. 2023    [] PEG 3350-KCl-Na Bicarb-NaCl (TRILYTE) 420 g Oral Recon Soln Take 4,000 mL (4 L total) by mouth one time for 1 dose. 4000 mL 0     Elastic Bandages & Supports (MEDICAL COMPRESSION STOCKINGS) Does not apply Misc 1 each daily. 20-30 mmHg 1 each 1      Current Facility-Administered Medications   Medication Dose Route Frequency    lactated ringers infusion   Intravenous Continuous       Allergies:    Allergies   Allergen Reactions    Penicillins      Other reaction(s): Rash       Past Medical History:   Diagnosis Date    Acute on chronic renal insufficiency 2018    PROSPER (acute kidney injury) ([facility] [address] 2018    PROSPER (acute kidney injury) ([address] 2018    Calculus of gallbladder without cholecystitis without obstruction 10/09/2015    Cataracts, bilateral 2016    Cataracts, bilateral 2016    Diabetes ([address]     Diabetes mellitus ([address]     Gallstone pancreatitis 10/30/2015    Gallstone pancreatitis 10/30/2015    High blood pressure     High cholesterol     HTN (hypertension), benign 2016    Macular pucker, right 2016    Macular pucker, right 09/08/2016    Morbid obesity with BMI of 60.0-69.9, adult ([facility] [address] 01/20/2016    Obesity hypoventilation syndrome ([facility] [address] 02/12/2016    Obesity, unspecified     Obstructive sleep apnea 03/12/2013    Osteoarthritis     Other and unspecified hyperlipidemia     Pancreatitis 08/04/2015    Renal disorder     Unspecified essential hypertension     Unspecified sleep apnea     Vitreous floaters of both eyes 09/08/2016    Vitreous floaters of both eyes 09/08/2016     Past Surgical History:   Procedure Laterality Date    CHOLECYSTECTOMY  3/10/16     COLONOSCOPY N/A 3/22/2019    Procedure: COLONOSCOPY;  Surgeon: Carolina De León MD;  Location: [address] [place] [address] ENDOSCOPY    POLYSOMNOGRAPHY W/CPAP  2013     Family History   Problem Relation Age of Onset    Cancer Father         lung cancer    Diabetes Father     Cancer Mother         non hodgkins lymphoma    Diabetes Mother     Glaucoma Neg     Macular degeneration Neg      Social History     Tobacco Use    Smoking status: Never    Smokeless tobacco: Never   Substance Use Topics    Alcohol use: Yes     Alcohol/week: 0.0 standard drinks of alcohol     Comment: rare       SYSTEM Check if Review is Normal Check if Physical Exam is Normal If not normal, please explain:   HEENT [place] ] [ Glenn [place]  [facility].Margarita ] [ Marshia Riggers [facility] ] [ Alison Loop [facility].Margarita ] [ Shanice Rust [facility] ] [ David Flower [facility].Margarita ] [ Cindi Curling        [ x ] I have discussed the risks and benefits and alternatives with the patient/family. They understand and agree to proceed with plan of care. [ x ] I have reviewed the History and Physical done within the last 30 days. Any changes noted above.     Leon Marcelo MD  11/14/2023  12:38 PM

## 2023-11-16 NOTE — TELEPHONE ENCOUNTER
----- Message from Beka Neal MD sent at 11/15/2023  7:15 PM CST -----  I spoke to Arnaldo Woodson. She is feeling well. She had #7 subcentimeter polyps removed. #5 were tubular adenomas and the other #2 hyperplastic. I discussed the significance. I recommended a high-fiber diet for diverticulosis and a surveillance colonoscopy in 3 years if medically stable. GI RNs: Please enter colonoscopy recall for 3 years.

## 2023-11-17 NOTE — TELEPHONE ENCOUNTER
Health maintenance updated. Last colonoscopy done 11/14/2023 By Dr Lucy Barker. Recall placed into Pt Outreach, next due on 11/2026 per Dr Lucy Barker.

## 2023-12-05 NOTE — TELEPHONE ENCOUNTER
Refill passed per [facility], [facility] protocol. Requested Prescriptions   Pending Prescriptions Disp Refills    FUROSEMIDE 20 MG Oral Tab [Pharmacy Med Name: FUROSEMIDE 20 MG TABLET] 90 tablet 0     Sig: TAKE 1 TABLET BY MOUTH EVERY DAY       Hypertensive Medications Protocol Passed - 12/3/2023  6:03 PM        Passed - In person appointment in the past 12 or next 3 months     Recent Outpatient Visits              4 months ago Diabetes mellitus type 2 in obese     Blake Angelo MD    Office Visit    1 year ago Acute on chronic renal insufficiency    Blake Angelo MD    Office Visit    2 years ago Pain in both knees, unspecified chronicity    Blake Angelo MD    Office Visit    2 years ago Immunization due    [address], [address], [address]    Nurse Only    3 years ago Encounter for screening for malignant neoplasm of breast, unspecified screening modality    [place]    Office Visit                      Passed - Last BP reading less than 140/90     BP Readings from Last 1 Encounters:   11/14/23 123/76               Passed - CMP or BMP in past 6 months     Recent Results (from the past 4392 hour(s))   Basic Metabolic Panel (8)    Collection Time: 10/19/23 10:21 AM   Result Value Ref Range    Glucose 96 70 - 99 mg/dL    Sodium 141 136 - 145 mmol/L    Potassium 4.2 3.5 - 5.1 mmol/L    Chloride 107 98 - 112 mmol/L    CO2 27.0 21.0 - 32.0 mmol/L    Anion Gap 7 0 - 18 mmol/L    BUN 14 7 - 18 mg/dL    Creatinine 0.88 0.55 - 1.02 mg/dL    BUN/CREA Ratio 15.9 10.0 - 20.0    Calcium, Total 9.2 8.5 - 10.1 mg/dL    Calculated Osmolality 292 275 - 295 mOsm/kg    eGFR-Cr 71 >=60 mL/min/1.73m2    Patient Fasting for BMP?  Yes      *Note: Due to a large number of results and/or encounters for the requested time period, some results have not been displayed. A complete set of results can be found in Results Review.                Passed - In person appointment or virtual visit in the past 6 months     Recent Outpatient Visits              4 months ago Diabetes mellitus type 2 in obese     Mira Ewing MD    Office Visit    1 year ago Acute on chronic renal insufficiency    Mira Ewing MD    Office Visit    2 years ago Pain in both knees, unspecified chronicity    Mira Ewing MD    Office Visit    2 years ago Immunization due    [facility] [place]    Nurse Only    3 years ago Encounter for screening for malignant neoplasm of breast, unspecified screening modality    Mira Ewing MD    Office Visit                      Passed - EGFRCR or GFRNAA > 50     GFR Evaluation  EGFRCR: 71 , resulted on 10/19/2023                     Recent Outpatient Visits              4 months ago Diabetes mellitus type 2 in obese     Mira Ewing MD    Office Visit    1 year ago Acute on chronic renal insufficiency    Mira Ewing MD    Office Visit    2 years ago Pain in both knees, unspecified chronicity    Mira Ewing MD    Office Visit    2 years ago Immunization due    [facility] [place]    Nurse Only    3 years ago Encounter for screening for malignant neoplasm of breast, unspecified screening modality    Mira Ewing MD    Office Visit

## 2024-03-18 NOTE — TELEPHONE ENCOUNTER
Protocol Failed/ No Protocol    Requested Prescriptions   Pending Prescriptions Disp Refills    METFORMIN  MG Oral Tablet 24 Hr [Pharmacy Med Name: METFORMIN HCL  MG TABLET] 90 tablet 1     Sig: TAKE 1 TABLET BY MOUTH EVERY DAY WITH BREAKFAST       Diabetes Medication Protocol Failed - 3/15/2024  3:44 PM        Failed - Last A1C < 7.5 and within past 6 months     Lab Results   Component Value Date    A1C 6.0 (A) 07/25/2023             Failed - In person appointment or virtual visit in the past 6 mos or appointment in next 3 mos     Recent Outpatient Visits              7 months ago Diabetes mellitus type 2 in obese (HCC)    [facility] Tanja, [facility]    Office Visit    1 year ago Acute on chronic renal insufficiency    [facility]    [facility] Visit    2 years ago Pain in both knees, unspecified chronicity    [facility]    Office Visit    2 years ago Immunization due    [facility] [address] [place]    Nurse Only    3 years ago Encounter for screening for malignant neoplasm of breast, unspecified screening modality    [facility]    [facility]                      Passed - Microalbumin procedure in past 12 months or taking ACE/ARB        Passed - EGFRCR or GFRNAA > 50     GFR Evaluation  EGFRCR: 71 , resulted on 10/19/2023          Passed - GFR in the past 12 months               Recent Outpatient Visits              7 months ago Diabetes mellitus type 2 in obese (HCC)    [facility]    Office Visit    1 year ago Acute on chronic renal insufficiency    [facility]    Office Visit    2 years ago Pain in both knees,  unspecified chronicity    [facility]    [facility] Visit    2 years ago Immunization due    [facility] [address], [place]    Nurse Only    3 years ago Encounter for screening for malignant neoplasm of breast, unspecified screening modality    [facility] Pitt Tanja, MD    Office Visit

## 2024-05-16 NOTE — TELEPHONE ENCOUNTER
LOV 1/15/19. Med is not noted in [address] Refill pended and routed to Dr. Aguilar Smith to approve. no

## 2024-05-20 NOTE — TELEPHONE ENCOUNTER
Action Requested: Summary for Provider     []  Critical Lab, Recommendations Needed  [] Need Additional Advice  []   FYI    []   Need Orders  [] Need Medications Sent to Pharmacy  []  Other     SUMMARY: Patient calling to schedule an appointment- scheduled for first available as patient does need transportation. Patient states in the past one month bilateral lower legs have become more swollen. Was taking diuretic (furosemide 20 mg) every other day, however started to take daily about 2-3 weeks ago d/t increased swelling and noted weight fluctuation. Does have weeping to RLE d/t dropping a cook book and the corner hit her leg in 3 spots. 2 spots are about the size of an eraser head-started Wed 5/15. Has been covering it, denies signs/symptoms of infection. Please also refer to Assessment Questions below.    Future Appointments   Date Time Provider Department Center   5/21/2024  9:40 AM Shabnam Arechiga APRN [facility]     States seen a nephrologist- has \"not seen in years\"  Last seen Dr. Acuña- 07/2023 who also manages diabetes    Reason for call: Leg Swelling  Onset: about 1 month    Denies chest pain, fever, shortness of breath, difficulty breathing, hand swelling    Will lay in bed a few times a day and is wrapping right leg with gauze and tape to weeping area    Went over home care advice. Call back, go to Walk in care or Immediate care if new symptoms arise,  if signs/symptoms worsen or has questions or concerns. Patient verbalized understanding and agrees with plan.    Reason for Disposition   MODERATE swelling of both ankles (e.g., swelling extends up to the knees) AND new-onset or worsening    Answer Assessment - Initial Assessment Questions  1. ONSET: \"When did the swelling start?\" (e.g., minutes, hours, days)      Worse past month  2. LOCATION: \"What part of the leg is swollen?\"  \"Are both legs swollen or just one leg?\"      BLL- right leg slightly worse  3. SEVERITY: \"How bad is the swelling?\"  (e.g., localized; mild, moderate, severe)   - Localized - small area of swelling localized to one leg   - MILD pedal edema - swelling limited to foot and ankle, pitting edema < 1/4 inch (6 mm) deep, rest and elevation eliminate most or all swelling   - MODERATE edema - swelling of lower leg to knee, pitting edema > 1/4 inch (6 mm) deep, rest and elevation only partially reduce swelling   - SEVERE edema - swelling extends above knee, facial or hand swelling present       Non-pedal edema  4. REDNESS: \"Does the swelling look red or infected?\"      Denies signs of inflammation- \"tiny bit red\" to where weeping is  5. PAIN: \"Is the swelling painful to touch?\" If Yes, ask: \"How painful is it?\"   (Scale 1-10; mild, moderate or severe)      No pain  6. FEVER: \"Do you have a fever?\" If Yes, ask: \"What is it, how was it measured, and when did it start?\"       no  7. CAUSE: \"What do you think is causing the leg swelling?\"      Kidney Disease  8. MEDICAL HISTORY: \"Do you have a history of heart failure, kidney disease, liver failure, or cancer?\"      Kidney, diabetes  9. RECURRENT SYMPTOM: \"Have you had leg swelling before?\" If Yes, ask: \"When was the last time?\" \"What happened that time?\"      On going; however worse the past month  10. OTHER SYMPTOMS: \"Do you have any other symptoms?\" (e.g., chest pain, difficulty breathing)        Noted 10 pound weight fluctuation between Monday 13th - Friday 17th  WEIGHT MONDAY 5/13: 212 LBS  WEIGHT FRIDAY 5/17: 324.4 LBS  WEIGHT TODAY; MONDAY 5/20: 325.7 LBS    Protocols used: Leg Swelling and Edema-A-OH

## 2024-05-21 NOTE — TELEPHONE ENCOUNTER
Patient seen in office today for bilateral lower extremity swelling.   CMP completed and shows improved kidney function, potassium also normal.   APRN spoke to Dr. Acuña and hunter to increase furosemide 20 mg to twice daily x 7 days. Potassium chloride 10 mEq twice daily ordered for 7 days during increase of Furosemide.     Advised patient to continue to use ace wraps as instructed during office visit.     Patient will call Dr. Acuña on Thursday for update on leg swelling

## 2024-05-21 NOTE — PROGRESS NOTES
HPI:    Patient ID: Rosario Pelayo is a 69 year old female.    HPI     Patient here in office with complaint of bilateral lower leg swelling, has worsened over past 2 weeks.  Takes furosemide 20 mg every other day due to decreased kidney function.  Reports she recently has been taking furosemide 20 mg daily due to increased lower extremity swelling.  Also reports intermittent shortness of breath with activity.  Compression stockings recommended, did not start wearing as instructed.  Denies history of heart failure.  Last cardiac echo completed on 9/6/2019 showed ejection fraction of 55-60%. Reports increased weight of 3 lbs over the last couple of days.     Also complains of small open wound to right lower extremity with clear drainage.    Review of Systems   Constitutional: Negative.    Respiratory:  Positive for shortness of breath. Negative for wheezing.    Cardiovascular:  Positive for leg swelling. Negative for chest pain and palpitations.   Skin:  Positive for wound.   Neurological: Negative.    Psychiatric/Behavioral: Negative.              Current Outpatient Medications   Medication Sig Dispense Refill    metFORMIN  MG Oral Tablet 24 Hr Take 1 tablet (500 mg total) by mouth daily with breakfast. 90 tablet 0    furosemide 20 MG Oral Tab Take 1 tablet (20 mg total) by mouth daily. 90 tablet 1    Elastic Bandages & Supports (MEDICAL COMPRESSION STOCKINGS) Does not apply Misc 1 each daily. 20-30 mmHg 1 each 1    atorvastatin 20 MG Oral Tab TAKE 1 TABLET BY MOUTH EVERYDAY AT BEDTIME 90 tablet 3    zolpidem 10 MG Oral Tab TAKE 1 TABLET BY MOUTH EVERYDAY AT BEDTIME (Patient taking differently: Take 0.5 tablets (5 mg total) by mouth nightly. TAKE 1 TABLET BY MOUTH EVERYDAY AT BEDTIME) 30 tablet 1    Acetaminophen (TYLENOL ARTHRITIS PAIN OR) Take 2 tablets by mouth daily.       Allergies:  Allergies   Allergen Reactions    Penicillins      Other reaction(s): Rash      /80   Pulse 80   Temp 97.3 °F (36.3  °C) (Temporal)   Resp 18   Ht 4' 11\" (1.499 m)   Wt (!) 327 lb (148.3 kg)   SpO2 95%   BMI 66.05 kg/m²   Body mass index is 66.05 kg/m².  PHYSICAL EXAM:   Physical Exam  Vitals reviewed.   Constitutional:       General: She is not in acute distress.     Appearance: Normal appearance. She is not ill-appearing.   Cardiovascular:      Rate and Rhythm: Normal rate and regular rhythm.      Heart sounds: Normal heart sounds. No murmur heard.     No friction rub. No gallop.   Pulmonary:      Effort: Pulmonary effort is normal. No respiratory distress.      Breath sounds: Normal breath sounds. No stridor. No wheezing, rhonchi or rales.   Chest:      Chest wall: No tenderness.   Musculoskeletal:      Right lower leg: Edema present.      Left lower leg: Edema present.      Comments: 3 + pitting edema to bilateral lower extremities    Skin:     Findings: Lesion present.      Comments: Small open wound on right anterior leg with clear drainage    Neurological:      General: No focal deficit present.      Mental Status: She is alert and oriented to person, place, and time.   Psychiatric:         Mood and Affect: Mood normal.         Behavior: Behavior normal.         Thought Content: Thought content normal.         Judgment: Judgment normal.                ASSESSMENT/PLAN:   1. Diabetes mellitus type 2 without retinopathy (HCC)  - Continue present management   - Comp Metabolic Panel (14); Future  - Hemoglobin A1C; Future  - Lipid Panel; Future  - Microalb/Creat Ratio, Random Urine; Future    2. Abnormal kidney function   - Comp Metabolic Panel (14); Future    3. Leg swelling  - Ace bandages applied to lower extremities during office visit, wear during the day, and remove at night   - Will await lab results for further recomnendations. May consider dose increase in Furosemide based on lab results, will discuss with Dr. Acuña   - Comp Metabolic Panel (14); Future  - BNP (Brain Natriuretic Peptide) [E]; Future    4. Shortness  of breath  - BNP (Brain Natriuretic Peptide) [E]; Future      Orders Placed This Encounter   Procedures    Comp Metabolic Panel (14)    Hemoglobin A1C    Lipid Panel    Microalb/Creat Ratio, Random Urine    BNP (Brain Natriuretic Peptide) [E]       Meds This Visit:  Requested Prescriptions      No prescriptions requested or ordered in this encounter       Imaging & Referrals:  None         ID#[id number]

## 2024-05-30 NOTE — TELEPHONE ENCOUNTER
Spoke with patient, Date of Birth verified  Patient was informed of MD recommendation, she stated understanding.  She will call back after speaking to her sister as she will need a ride from her.

## 2024-05-30 NOTE — TELEPHONE ENCOUNTER
Condition update:  Patient calling today, states she completed Shabnam CENTENO's instructions (shown below).  Reports has dropped 2 pounds in weight, otherwise not much improvement in swelling.  Had 3 spots that had been weeping fluid on right leg, now just 1 tiny spot with small amount of fluid.  Left leg still has \"bubbly\" appearance.  Has been trying to wrap the legs but needs assistance from family.  On 6/03/2024 she plans to purchase compression hose.  Denies chest pain or shortness of breath.    Dr Acuña, please advise on any additional orders for lower leg edema?      Previous  note:    Shabnam Arechiga APRN      5/21/24  5:41 PM  Note     Patient seen in office today for bilateral lower extremity swelling.   CMP completed and shows improved kidney function, potassium also normal.   JACOBO spoke to Dr. Acuña and hunter to increase furosemide 20 mg to twice daily x 7 days. Potassium chloride 10 mEq twice daily ordered for 7 days during increase of Furosemide.      Advised patient to continue to use ace wraps as instructed during office visit.      Patient will call Dr. Acuña on Thursday for update on leg swelling

## 2024-05-30 NOTE — TELEPHONE ENCOUNTER
Patient called back, appointment scheduled for this Saturday June 1==see Dr Acuña's note below.       Future Appointments   Date Time Provider Department Center   6/1/2024 10:00 AM [facility]

## 2024-06-01 NOTE — PROGRESS NOTES
Subjective:   Patient ID: Rosario Pelayo is a 69 year old female.    HPI  Patient with persistent swelling both lower extremities left more then right   It has been in the past but lately progressively worse   No dyspnea  History/Other:   Review of Systems    Constitutional: Negative.  Negative for activity change, appetite change, diaphoresis and fatigue.     Respiratory: Negative.  Negative for apnea, cough, chest tightness and shortness of breath.    Cardiovascular: Negative.  Negative for chest pain, palpitations and leg swelling.   Gastrointestinal: Negative.  Negative for abdominal pain.   BLE see hpi  Current Outpatient Medications   Medication Sig Dispense Refill    furosemide 40 MG Oral Tab Take 1 tablet (40 mg total) by mouth 2 (two) times daily. 40 tablet 0    Potassium Chloride ER 20 MEQ Oral Tab CR Take 20 mEq by mouth daily. 30 tablet 0    metFORMIN  MG Oral Tablet 24 Hr Take 1 tablet (500 mg total) by mouth daily with breakfast. 90 tablet 0    furosemide 20 MG Oral Tab Take 1 tablet (20 mg total) by mouth daily. 90 tablet 1    atorvastatin 20 MG Oral Tab TAKE 1 TABLET BY MOUTH EVERYDAY AT BEDTIME 90 tablet 3    zolpidem 10 MG Oral Tab TAKE 1 TABLET BY MOUTH EVERYDAY AT BEDTIME (Patient taking differently: Take 0.5 tablets (5 mg total) by mouth nightly. TAKE 1 TABLET BY MOUTH EVERYDAY AT BEDTIME) 30 tablet 1    Acetaminophen (TYLENOL ARTHRITIS PAIN OR) Take 2 tablets by mouth daily.      Potassium Chloride ER 10 MEQ Oral Tab CR Take 1 tablet (10 mEq total) by mouth 2 (two) times daily. (Patient not taking: Reported on 6/1/2024) 14 tablet 0    Elastic Bandages & Supports (MEDICAL COMPRESSION STOCKINGS) Does not apply Misc 1 each daily. 20-30 mmHg (Patient not taking: Reported on 6/1/2024) 1 each 1     Allergies:  Allergies   Allergen Reactions    Penicillins      Other reaction(s): Rash       Objective:   Physical Exam  Constitutional:       Appearance: She is obese.   Cardiovascular:      Rate  and Rhythm: Normal rate and regular rhythm.      Pulses: Normal pulses.      Heart sounds: Normal heart sounds.   Pulmonary:      Effort: Pulmonary effort is normal.      Breath sounds: Normal breath sounds.   Musculoskeletal:      Right lower leg: Edema present.      Left lower leg: Edema present.   Neurological:      Mental Status: She is alert.         Assessment & Plan:   1. Edema, unspecified type    Increase lasix   Do echo   Elavet legs   See dr tan    No orders of the defined types were placed in this encounter.      Meds This Visit:  Requested Prescriptions     Signed Prescriptions Disp Refills    furosemide 40 MG Oral Tab 40 tablet 0     Sig: Take 1 tablet (40 mg total) by mouth 2 (two) times daily.    Potassium Chloride ER 20 MEQ Oral Tab CR 30 tablet 0     Sig: Take 20 mEq by mouth daily.       Imaging & Referrals:  CARDIO - INTERNAL  CARD ECHO 2D DOPPLER (CPT=93306)

## 2024-06-18 NOTE — TELEPHONE ENCOUNTER
May go down to 20 mg po bid   Start kdur 20 mg every other day with that   Also repeat bmp ( ordered)   I would like to see her at end of this beginning next week   May use same day apts

## 2024-06-18 NOTE — TELEPHONE ENCOUNTER
Patient was called and she was given a appointment  Future Appointments   Date Time Provider Department Center   6/21/2024 10:10 AM Sabine Acuña MD [facility]

## 2024-06-18 NOTE — TELEPHONE ENCOUNTER
Patient calling, was increased in dose of lasix 40 BID for the last 2 weeks. Legs are looking better, still edema but better. She has been weighing herself and she has lost about 10lb since starting the increased dose.     She did see Dr. Liu June 4th, he had her do a US of her legs, she has not heard on those results. Cardio MD also agrees that she should have the echo done which she is having done at [facility] location July 1st. This was soonest between  and [facility] so she took it.     Patient still taking the Potassium 20MEQ daily    Patient feeling ok, no cramping, light headedness or concerns.     She is asking if she should continue the 40mg of lasix BID with k+ or go back to 20MG and no k+ as before seeing you 6/1/24 - Patient says she was to do this increased dose for 2 weeks but is unsure how to proceed now.     Patient also asking if BMP should be rechecked?

## 2024-06-18 NOTE — TELEPHONE ENCOUNTER
patient was called and inform of your message below. To go down on her lasix to 20 mg twice a day and start Kdur 20 meq every other day. Patient verbalized understanding.  You have no appointments or same day appointment for the end of tis week or early next week.   Patient can be seen on June 21 at 10 am or June 25 at 10 am. Please advise if she can be added to your schedule. Thank you

## 2024-06-21 NOTE — TELEPHONE ENCOUNTER
Refill passed per [facility] protocol.    Requested Prescriptions   Pending Prescriptions Disp Refills    metFORMIN  MG Oral Tablet 24 Hr 90 tablet 0     Sig: Take 1 tablet (500 mg total) by mouth daily with breakfast.       Diabetes Medication Protocol Passed - 6/19/2024  8:02 AM        Passed - Last A1C < 7.5 and within past 6 months     Lab Results   Component Value Date    A1C 5.9 (H) 05/21/2024             Passed - In person appointment or virtual visit in the past 6 mos or appointment in next 3 mos     Recent Outpatient Visits              Today     [facility]    Office Visit    2 weeks ago Edema, unspecified type    [facility]    Office Visit    1 month ago Diabetes mellitus type 2 without retinopathy (HCC)    [facility]    Office Visit    11 months ago Diabetes mellitus type 2 in obese (HCC)    [facility]    Office Visit    1 year ago Acute on chronic renal insufficiency    [facility]    [facility] Visit                      Passed - Microalbumin procedure in past 12 months or taking ACE/ARB        Passed - EGFRCR or GFRNAA > 50     GFR Evaluation  EGFRCR: 69 , resulted on 6/19/2024          Passed - GFR in the past 12 months           Recent Outpatient Visits              Today     [facility]    Office Visit    2 weeks ago Edema, unspecified type    [facility]    Office Visit    1 month ago Diabetes mellitus type 2 without retinopathy (HCC)    [facility], APR[facility]    Office Visit    11 months ago  Diabetes mellitus type 2 in obese (HCC)    [facility], Sabine Subramanian MD    Office Visit    1 year ago Acute on chronic renal insufficiency    [facility] Sabine Subramanian MD    Office Visit

## 2024-06-21 NOTE — PROGRESS NOTES
Subjective:   Patient ID: Rosario Pelayo is a 69 year old female.    HPI  Patient here for f/u edema.   Slightly better   She states that it was much better when she was taking lasix 40 mg po bid   History/Other:   Review of Systems    Constitutional: Negative.  Negative for activity change, appetite change, diaphoresis and fatigue.     Respiratory: Negative.  Negative for apnea, cough, chest tightness and shortness of breath.    Cardiovascular: Negative.  Negative for chest pain, palpitations and leg swelling.   Gastrointestinal: Negative.  Negative for abdominal pain.   BLE see hpi   Current Outpatient Medications   Medication Sig Dispense Refill    Potassium Chloride ER 20 MEQ Oral Tab CR Take 20 mEq by mouth daily. 90 tablet 0    metFORMIN  MG Oral Tablet 24 Hr Take 1 tablet (500 mg total) by mouth daily with breakfast. 90 tablet 3    Potassium Chloride ER 20 MEQ Oral Tab CR Take 20 mEq by mouth daily. 30 tablet 0    furosemide 20 MG Oral Tab Take 1 tablet (20 mg total) by mouth daily. 90 tablet 1    atorvastatin 20 MG Oral Tab TAKE 1 TABLET BY MOUTH EVERYDAY AT BEDTIME 90 tablet 3    Acetaminophen (TYLENOL ARTHRITIS PAIN OR) Take 2 tablets by mouth daily.      furosemide 40 MG Oral Tab Take 1 tablet (40 mg total) by mouth 2 (two) times daily. (Patient not taking: Reported on 6/21/2024) 40 tablet 0    Potassium Chloride ER 10 MEQ Oral Tab CR Take 1 tablet (10 mEq total) by mouth 2 (two) times daily. (Patient not taking: Reported on 6/1/2024) 14 tablet 0    Elastic Bandages & Supports (MEDICAL COMPRESSION STOCKINGS) Does not apply Misc 1 each daily. 20-30 mmHg (Patient not taking: Reported on 6/1/2024) 1 each 1    zolpidem 10 MG Oral Tab TAKE 1 TABLET BY MOUTH EVERYDAY AT BEDTIME (Patient not taking: Reported on 6/21/2024) 30 tablet 1     Allergies:  Allergies   Allergen Reactions    Penicillins      Other reaction(s): Rash       Objective:   Physical Exam  Cardiovascular:      Rate and Rhythm: Normal rate  and regular rhythm.      Pulses: Normal pulses.      Heart sounds: Normal heart sounds.   Musculoskeletal:      Right lower leg: Edema present.      Left lower leg: Edema present.   Neurological:      Mental Status: She is alert.         Assessment & Plan:   1. Edema, unspecified type    Increase lasix to 40 mg po bid   F/u with dr tan in 2 weeks     Orders Placed This Encounter   Procedures    Basic Metabolic Panel (8) [E]       Meds This Visit:  Requested Prescriptions     Signed Prescriptions Disp Refills    Potassium Chloride ER 20 MEQ Oral Tab CR 90 tablet 0     Sig: Take 20 mEq by mouth daily.    metFORMIN  MG Oral Tablet 24 Hr 90 tablet 3     Sig: Take 1 tablet (500 mg total) by mouth daily with breakfast.       Imaging & Referrals:  None

## 2024-08-31 NOTE — TELEPHONE ENCOUNTER
Patient had an x ray and it showed small nodules in the lungs   Recommendation is that she repeats the ct chest in 1 year   I placed order   I also called her and left message on the voice mail as she did not  the phone  Please call her and let me know if she is in agreement with this

## 2024-09-04 NOTE — TELEPHONE ENCOUNTER
Patient stated that she received the [email or web address] message below and she was calling to inform us that she does agree and she will get the Ct scan of the chest done in 1 year.         Prep Instructions  Message [id number]  From  Liliam Mairano RN To  Rosario Pelayo Sent and Delivered  9/1/2024  6:58 PM   Last Read in [facility]  9/4/2024  9:36 AM by Rosario Ponce,     Per Dr. Acuña,     Patient had an x ray and it showed small nodules in the lungs.  Recommendation is that she repeats the CT chest in 1 year.  Order placed.  I also called her and left message on the voice mail as she did not  the phone.  Please call us back or let us know if you are in agreement.     Thank you,  KATY Ricketts

## 2024-09-20 NOTE — TELEPHONE ENCOUNTER
Refill Per Protocol     Requested Prescriptions   Pending Prescriptions Disp Refills    FUROSEMIDE 20 MG Oral Tab [Pharmacy Med Name: FUROSEMIDE 20 MG TABLET] 90 tablet 1     Sig: TAKE 1 TABLET BY MOUTH EVERY DAY       Hypertension Medications Protocol Passed - 9/17/2024 12:12 AM        Passed - CMP or BMP in past 12 months        Passed - Last BP reading less than 140/90     BP Readings from Last 1 Encounters:   06/21/24 122/76               Passed - In person appointment or virtual visit in the past 12 mos or appointment in next 3 mos     Recent Outpatient Visits              3 months ago Edema, unspecified type    [facility]    [facility] Visit    3 months ago Edema, unspecified type    [facility]    Office Visit    4 months ago Diabetes mellitus type 2 without retinopathy (HCC)    [facility]    [facility] Visit    1 year ago Diabetes mellitus type 2 in obese (HCC)    [facility]    Office Visit    1 year ago Acute on chronic renal insufficiency    [facility]    [facility] Visit                      Passed - EGFRCR or GFRNAA > 50     GFR Evaluation  EGFRCR: 70 , resulted on 7/24/2024                   Recent Outpatient Visits              3 months ago Edema, unspecified type    [facility]    [facility] Visit    3 months ago Edema, unspecified type    [facility]    [facility] Visit    4 months ago Diabetes mellitus type 2 without retinopathy (HCC)    [facility]    Office Visit    1 year ago Diabetes mellitus type 2 in  obese (HCC)    [facility], Sabine Subramanian MD    Office Visit    1 year ago Acute on chronic renal insufficiency    [facility], Sabine Subramanian MD    Office Visit

## (undated) DEVICE — 3 ML SYRINGE LUER-LOCK TIP: Brand: MONOJECT

## (undated) DEVICE — 6 ML SYRINGE LUER-LOCK TIP: Brand: MONOJECT

## (undated) DEVICE — LINE MNTR ADLT SET O2 INTMD

## (undated) DEVICE — Device: Brand: CUSTOM PROCEDURE KIT

## (undated) DEVICE — Device: Brand: DEFENDO AIR/WATER/SUCTION AND BIOPSY VALVE

## (undated) DEVICE — SNARE CAPTIFLEX MICRO-OVL OLY

## (undated) DEVICE — MEDI-VAC NON-CONDUCTIVE SUCTION TUBING 6MM X 1.8M (6FT.) L: Brand: CARDINAL HEALTH

## (undated) DEVICE — SNARE OPTMZ PLPCTM TRP

## (undated) NOTE — LETTER
September 14, 2017    Christina Ríos MD  [address] [facility]     Patient: Yung Ramirez   YOB: 1955   Date of Visit: 9/14/2017       Dear Dr. Jesus Carias MD:    Thank you for referring Yung Ramirez to me for ev DAY Disp: 90 tablet Rfl: 0   ATORVASTATIN 20 MG Oral Tab TAKE 1 TABLET BY MOUTH EVERY DAY AT BEDTIME .THIS REPLACES ROSUVASTATIN 10MG Disp: 90 tablet Rfl: 0   FUROSEMIDE 20 MG Oral Tab TAKE 1 TABLET (20 MG TOTAL) BY MOUTH ONCE DAILY.  Disp: 90 tablet Rfl: 0 Anterior Chamber Deep and quiet Deep and quiet    Iris Normal Normal    Lens Trace Nuclear sclerosis Trace Nuclear sclerosis    Vitreous Vitreous floaters Vitreous floaters          Fundus Exam       Right Left    Disc Good rim, Temporal crescent Good rim

## (undated) NOTE — LETTER
05/24/18        Mickey Calderon  [address] [facility]      Dear [employer],    [facility] records indicate that you have outstanding lab work and or testing that was ordered for you and has not yet been completed:          Comp Metabolic Pan

## (undated) NOTE — LETTER
Joleen Austin has been receiving Physical Therapy and has demonstrated significant improvements in mobility. Patient may benefit from continued PT and will need a new HMO referral for 6 more PT visits. See below for my most recent plan of care. 5. Patient will be able to go up and down stairs with alternating steps and use of rail. 6. Patient will verbalize and demonstrate strategies to improve posture during activity.             Rehab Potential: good    Plan: Continue skilled Physcial Therapy 2

## (undated) NOTE — ED AVS SNAPSHOT
Luh Panda   MRN: [id number]    Department:  [facility] Emergency Department   Date of Visit:  7/21/2019           Disclosure     Insurance plans vary and the physician(s) referred by the ER may not be covered by your plan.  Please cont within the next three months to obtain basic health screening including reassessment of your blood pressure.     IF THERE IS ANY CHANGE OR WORSENING OF YOUR CONDITION, CALL YOUR PRIMARY CARE PHYSICIAN AT ONCE OR RETURN IMMEDIATELY TO THE EMERGENCY DEPARTMEN

## (undated) NOTE — LETTER
This patient may benefit from continued PT. She will need a new O referral for 10 more PT visits so she can continue her physical therapy without interruption. See below for my most recent plan of care.             Physical Therapy Progress Report    P Gait: Walks independently with a cane. Uses a rolling walker for longer distances or during community ambulation activities. Goals     • Therapy Goals      1. Patient will be independent with HEP, its progression, and management of residual symptoms.

## (undated) NOTE — LETTER
[address]  Authorization for Surgical Operation and Procedure                                                                                           I hereby authorize Carolina De León MD, my physician and his/her assistants (if applicable), which may include medical students, residents, and/or fellows, to perform the following surgical operation/ procedure and administer such anesthesia as may be determined necessary by my physician: Operation/Procedure name (s) COLONOSCOPY on Rosario Pelayo   2. I recognize that during the surgical operation/procedure, unforeseen conditions may necessitate additional or different procedures than those listed above. I, therefore, further authorize and request that the above-named surgeon, assistants, or designees perform such procedures as are, in their judgment, necessary and desirable. 3.   My surgeon/physician has discussed prior to my surgery the potential benefits, risks and side effects of this procedure; the likelihood of achieving goals; and potential problems that might occur during recuperation. They also discussed reasonable alternatives to the procedure, including risks, benefits, and side effects related to the alternatives and risks related to not receiving this procedure. I have had all my questions answered and I acknowledge that no guarantee has been made as to the result that may be obtained. 4.   Should the need arise during my operation/procedure, which includes change of level of care prior to discharge, I also consent to the administration of blood and/or blood products. Further, I understand that despite careful testing and screening of blood or blood products by collecting agencies, I may still be subject to ill effects as a result of receiving a blood transfusion and/or blood products.   The following are some, but not all, of the potential risks that can occur: fever and allergic reactions, hemolytic reactions, transmission of diseases such as Hepatitis, AIDS and Cytomegalovirus (CMV) and fluid overload. In the event that I wish to have an autologous transfusion of my own blood, or a directed donor transfusion, I will discuss this with my physician. Check only if Refusing Blood or Blood Products  I understand refusal of blood or blood products as deemed necessary by my physician may have serious consequences to my condition to include possible death. I hereby assume responsibility for my refusal and release the hospital, its personnel, and my physicians from any responsibility for the consequences of my refusal.    o  Refuse   5. I authorize the use of any specimen, organs, tissues, body parts or foreign objects that may be removed from my body during the operation/procedure for diagnosis, research or teaching purposes and their subsequent disposal by hospital authorities. I also authorize the release of specimen test results and/or written reports to my treating physician on the hospital medical staff or other referring or consulting physicians involved in my care, at the discretion of the Pathologist or my treating physician. 6.   I consent to the photographing or videotaping of the operations or procedures to be performed, including appropriate portions of my body for medical, scientific, or educational purposes, provided my identity is not revealed by the pictures or by descriptive texts accompanying them. If the procedure has been photographed/videotaped, the surgeon will obtain the original picture, image, videotape or CD. The hospital will not be responsible for storage, release or maintenance of the picture, image, tape or CD.    7.   I consent to the presence of a  or observers in the operating room as deemed necessary by my physician or their designees.     8.   I recognize that in the event my procedure results in extended X-Ray/fluoroscopy time, I may develop a skin reaction. 9. If I have a Do Not Attempt Resuscitation (DNAR) order in place, that status will be suspended while in the operating room, procedural suite, and during the recovery period unless otherwise explicitly stated by me (or a person authorized to consent on my behalf). The surgeon or my attending physician will determine when the applicable recovery period ends for purposes of reinstating the DNAR order. 10. Patients having a sterilization procedure: I understand that if the procedure is successful the results will be permanent and it will therefore be impossible for me to inseminate, conceive, or bear children. I also understand that the procedure is intended to result in sterility, although the result has not been guaranteed. 11. I acknowledge that my physician has explained sedation/analgesia administration to me including the risk and benefits I consent to the administration of sedation/analgesia as may be necessary or desirable in the judgment of my physician. I CERTIFY THAT I HAVE READ AND FULLY UNDERSTAND THE ABOVE CONSENT TO OPERATION and/or OTHER PROCEDURE.     _________________________________________ _________________________________     ___________________________________  Signature of Patient     Signature of Responsible Person                   Printed Name of Responsible Person                              _________________________________________ ______________________________        ___________________________________  Signature of Witness         Date  Time         Relationship to Patient    STATEMENT OF PHYSICIAN My signature below affirms that prior to the time of the procedure; I have explained to the patient and/or his/her legal representative, the risks and benefits involved in the proposed treatment and any reasonable alternative to the proposed treatment.  I have also explained the risks and benefits involved in refusal of the proposed treatment and alternatives to the proposed treatment and have answered the patient's questions.  If I have a significant financial interest in a co-management agreement or a significant financial interest in any product or implant, or other significant relationship used in this procedure/surgery, I have disclosed this and had a discussion with my patient.     _______________________________________________________________ _____________________________  Daniella Tovar of Physician)                                                                                         (Date)                                   (Time)  Patient Name: Louie Aguirre    : 4/10/1955   Printed: 11/10/2023      Medical Record #: [id number]                                              Page 1 of 1

## (undated) NOTE — LETTER
Patient is currently receiving PT but her referral is set to  on 2018. Please update her referral expiration date so she can continue with her PT appointments into 2018 without interruption.       Physical Therapy Treatment and Progress Report Gait: Walks independently with a cane. Uses a rolling walker for longer distances or during community ambulation activities.                 Goals                 • Therapy Goals           1.  Patient will be independent with HEP, its progression, and ana

## (undated) NOTE — LETTER
1/27/2022    Lola Watkins [address] [facility]            Dear Lola Zuñiga,      Our records indicate that you are due for an appointment for a Colonoscopy in March 2022, or sometime there after, with

## (undated) NOTE — MR AVS SNAPSHOT
[facility] - [place] [address] [address] [address] [phone]               Thank you for choosing us for your health care visit with Nakul Castanon MD.  We are glad to serve you and happy to provide you with this summary of yo Take 1 tablet (20 mg total) by mouth once daily. Commonly known as:  LASIX           lisinopril 10 MG Tabs   Take 1 tablet (10 mg total) by mouth daily.    Commonly known as:  PRINIVIL,ZESTRIL           MetFORMIN HCl  MG Tb24   Take 1 tablet (500 mg Fully enjoy your food when eating. Don’t eat while distracted and slow down. Avoid over sized portions. Don’t eat while when you’re bored.      EAT THESE FOODS MORE OFTEN: EAT THESE FOODS LESS OFTEN:   Make half your plate fruits and vegetables Highly

## (undated) NOTE — LETTER
No referring provider defined for this encounter. 08/19/19        Patient: Renaldo Herr   YOB: 1955   Date of Visit: 8/19/2019       Dear  Dr. Ita Holguin MD,      Thank you for referring Renaldo Herr to my practice.   Pl May have had some decrease in fluid and caloric intake during the last couple weeks that she has been feeling more tired than usual.  She states she is now feeling better since lisinopril and furosemide have been placed on hold.   She was not checking her b of your patient. If you have any questions please feel free to call.            Sincerely,   Joel Tsang MD   [facility] , [address] [place] [address]  [address]  [address] [facility] [address]    Document electronically generated

## (undated) NOTE — LETTER
No referring provider defined for this encounter. 09/27/19        Patient: Concepción Miller   YOB: 1955   Date of Visit: 9/27/2019       Dear  Dr. Dakota Allred MD,      Thank you for referring Concepción Miller to my practice.   Pl low-salt diet, elevation of the legs. She will continue to monitor blood pressures and if it should increase significantly she will let me know. Otherwise repeat a renal panel in 3 months. She is hoping to go to [place] in January or February.   If urban

## (undated) NOTE — LETTER
October 29, 2018     [address]  [place] [address]      Dear Aniyah Frame:    Below are the results from your recent visit:    Resulted Orders   HEMOGLOBIN A1C   Result Value Ref Range    HgbA1C 5.8 (H) <5.7 %    Erick Muñzo

## (undated) NOTE — LETTER
12/26/17        Nannette Thomas  [address]      Dear Ebony Robert,    [address] records indicate that you have outstanding lab work and or testing that was ordered for you and has not yet been completed:          Occult Blood, [place]

## (undated) NOTE — LETTER
12/4/2020              [facility]         Dear Barby Gutierrez,    [facility] records indicate that the tests ordered for you by Radha Eng MD  have not been done.   If you have, in fact, already

## (undated) NOTE — LETTER
6/1/2021              [address] [place] [address]         To Whom It May Concern,      Please excuse Sharon Todd from jury duty as she is under my medical care due to her limited mobility.